# Patient Record
Sex: MALE | Race: WHITE | NOT HISPANIC OR LATINO | Employment: FULL TIME | URBAN - METROPOLITAN AREA
[De-identification: names, ages, dates, MRNs, and addresses within clinical notes are randomized per-mention and may not be internally consistent; named-entity substitution may affect disease eponyms.]

---

## 2018-12-11 ENCOUNTER — OFFICE VISIT (OUTPATIENT)
Dept: URGENT CARE | Facility: CLINIC | Age: 33
End: 2018-12-11
Payer: COMMERCIAL

## 2018-12-11 VITALS
BODY MASS INDEX: 28 KG/M2 | TEMPERATURE: 97.4 F | RESPIRATION RATE: 16 BRPM | OXYGEN SATURATION: 95 % | DIASTOLIC BLOOD PRESSURE: 74 MMHG | SYSTOLIC BLOOD PRESSURE: 134 MMHG | HEIGHT: 71 IN | WEIGHT: 200 LBS | HEART RATE: 86 BPM

## 2018-12-11 DIAGNOSIS — J01.90 ACUTE SINUSITIS, RECURRENCE NOT SPECIFIED, UNSPECIFIED LOCATION: Primary | ICD-10-CM

## 2018-12-11 PROCEDURE — G0382 LEV 3 HOSP TYPE B ED VISIT: HCPCS | Performed by: PHYSICIAN ASSISTANT

## 2018-12-11 RX ORDER — AMOXICILLIN AND CLAVULANATE POTASSIUM 875; 125 MG/1; MG/1
1 TABLET, FILM COATED ORAL EVERY 12 HOURS SCHEDULED
Qty: 14 TABLET | Refills: 0 | Status: SHIPPED | OUTPATIENT
Start: 2018-12-11 | End: 2018-12-18

## 2019-05-25 NOTE — PROGRESS NOTES
[address] [facility]        NAME: Luciano Tate is a 26 y o  male  : 1985    MRN: [id number]  DATE: 2018  TIME: 3:02 PM    Assessment and Plan   Acute sinusitis, recurrence not specified, unspecified location [J01 90]  1  Acute sinusitis, recurrence not specified, unspecified location  amoxicillin-clavulanate (AUGMENTIN) 875-125 mg per tablet     Patient Instructions     Take antibiotic as prescribed  Follow up with PCP in 3-5 days  Proceed to  ER if symptoms worsen  Chief Complaint     Chief Complaint   Patient presents with    Nasal Congestion     Sore throat and congestion x 5 days  Using dayquil and nyquil  History of Present Illness     URI    This is a new problem  Episode onset: 10 days  The problem has been gradually worsening  The maximum temperature recorded prior to his arrival was 100 4 - 100 9 F  Associated symptoms include congestion, coughing, rhinorrhea, sinus pain, a sore throat and swollen glands  Pertinent negatives include no abdominal pain, chest pain, diarrhea, dysuria, ear pain, headaches, joint pain, joint swelling, nausea, neck pain, plugged ear sensation, rash, sneezing, vomiting or wheezing  He has tried nothing for the symptoms  The treatment provided moderate relief  Review of Systems   Review of Systems   Constitutional: Positive for fatigue and fever  Negative for activity change, appetite change, chills, diaphoresis and unexpected weight change  HENT: Positive for congestion, postnasal drip, rhinorrhea, sinus pain and sore throat  Negative for dental problem, drooling, ear discharge, ear pain and sneezing  Respiratory: Positive for cough  Negative for apnea, choking, chest tightness, shortness of breath, wheezing and stridor  Cardiovascular: Negative for chest pain, palpitations and leg swelling  Gastrointestinal: Negative for abdominal pain, diarrhea, nausea and vomiting  Genitourinary: Negative for dysuria     Musculoskeletal: Negative for joint pain and neck pain  Skin: Negative for rash  Neurological: Negative for headaches  Current Medications       Current Outpatient Prescriptions:     amoxicillin-clavulanate (AUGMENTIN) 875-125 mg per tablet, Take 1 tablet by mouth every 12 (twelve) hours for 7 days, Disp: 14 tablet, Rfl: 0    Current Allergies     Allergies as of 12/11/2018    (No Known Allergies)            The following portions of the patient's history were reviewed and updated as appropriate: allergies, current medications, past family history, past medical history, past social history, past surgical history and problem list      History reviewed  No pertinent past medical history  History reviewed  No pertinent surgical history  No family history on file  Medications have been verified  Objective   /74   Pulse 86   Temp (!) 97 4 °F (36 3 °C)   Resp 16   Ht 5' 11" (1 803 m)   Wt 90 7 kg (200 lb)   SpO2 95%   BMI 27 89 kg/m²        Physical Exam     Physical Exam   Constitutional: He appears well-developed and well-nourished  HENT:   Head: Normocephalic  Right Ear: External ear normal    Left Ear: External ear normal    Nose: Mucosal edema and rhinorrhea present  Mouth/Throat: Posterior oropharyngeal erythema present  No oropharyngeal exudate or posterior oropharyngeal edema  Cardiovascular: Normal rate, regular rhythm, normal heart sounds and intact distal pulses  Exam reveals no gallop and no friction rub  No murmur heard  Pulmonary/Chest: Effort normal and breath sounds normal  No respiratory distress  He has no decreased breath sounds  He has no wheezes  He has no rhonchi  He has no rales  Abdominal: Soft  Bowel sounds are normal  He exhibits no distension  There is no tenderness  There is no rebound and no guarding  Lymphadenopathy:     He has cervical adenopathy  Right cervical: Superficial cervical adenopathy present          Left cervical: Superficial cervical adenopathy present  Alert and interactive, no focal deficits

## 2021-05-04 ENCOUNTER — CONSULT (OUTPATIENT)
Dept: SURGERY | Facility: CLINIC | Age: 36
End: 2021-05-04
Payer: COMMERCIAL

## 2021-05-04 VITALS — HEIGHT: 70 IN | BODY MASS INDEX: 30.64 KG/M2 | WEIGHT: 214 LBS

## 2021-05-04 DIAGNOSIS — K42.9 UMBILICAL HERNIA: Primary | ICD-10-CM

## 2021-05-04 PROCEDURE — 99203 OFFICE O/P NEW LOW 30 MIN: CPT | Performed by: SURGERY

## 2021-05-04 RX ORDER — ALLOPURINOL 300 MG/1
300 TABLET ORAL
COMMUNITY
Start: 2021-04-07

## 2021-05-04 RX ORDER — COLCHICINE 0.6 MG/1
0.6 TABLET ORAL AS NEEDED
COMMUNITY
Start: 2021-03-16

## 2021-05-04 NOTE — H&P (VIEW-ONLY)
Assessment/Plan:  Patient presents with umbilical hernia  He has had pain over the last 1 month  The bulge been present over the last 7 months  He has discomfort associated with activity  He desires repair  Operative repair is recommended  Risks and benefits explained in detail  He is agreeable  Patient also complains abdominal bloating  This was associated with eating  This is nonspecific, I do not believe is related to the umbilical hernia site  He denies diarrhea, nausea or weight loss  Diagnoses and all orders for this visit:    Umbilical hernia    Other orders  -     colchicine (COLCRYS) 0 6 mg tablet; Take 0 6 mg by mouth every 8 (eight) hours  -     allopurinol (ZYLOPRIM) 300 mg tablet; Take 300 mg by mouth daily        Subjective:      Patient ID: Francisco Durand is a 38 y o  male  Patient presents for umbilical hernia consult  States he has had a bulge for 7 months  He had achy pain one month ago  Limits his activities  The following portions of the patient's history were reviewed and updated as appropriate:     He  has no past medical history on file  He  has no past surgical history on file  His family history is not on file  He  reports that he has never smoked  He has never used smokeless tobacco  He reports that he does not drink alcohol  No history on file for drug  Current Outpatient Medications   Medication Sig Dispense Refill    allopurinol (ZYLOPRIM) 300 mg tablet Take 300 mg by mouth daily      colchicine (COLCRYS) 0 6 mg tablet Take 0 6 mg by mouth every 8 (eight) hours       No current facility-administered medications for this visit  He has No Known Allergies       Review of Systems   Constitutional: Negative  Negative for activity change  HENT: Negative  Eyes: Negative  Respiratory: Negative  Cardiovascular: Negative  Gastrointestinal: Positive for abdominal pain  Endocrine: Negative  Genitourinary: Negative      Musculoskeletal: Positive for neck pain and neck stiffness  Skin: Negative  Allergic/Immunologic: Negative  Neurological: Negative  Psychiatric/Behavioral: Negative for agitation, behavioral problems and confusion  The patient is not nervous/anxious  All other systems reviewed and are negative  Objective:      Ht 5' 10" (1 778 m)   Wt 97 1 kg (214 lb)   BMI 30 71 kg/m²          Physical Exam  Constitutional:       Appearance: He is well-developed  He is not diaphoretic  HENT:      Head: Normocephalic and atraumatic  Eyes:      General: No scleral icterus  Right eye: No discharge  Left eye: No discharge  Conjunctiva/sclera: Conjunctivae normal       Pupils: Pupils are equal, round, and reactive to light  Neck:      Musculoskeletal: Normal range of motion and neck supple  Thyroid: No thyromegaly  Trachea: No tracheal deviation  Cardiovascular:      Rate and Rhythm: Normal rate and regular rhythm  Heart sounds: Normal heart sounds  No murmur  No friction rub  Pulmonary:      Effort: Pulmonary effort is normal  No respiratory distress  Breath sounds: Normal breath sounds  No stridor  No wheezing  Chest:      Chest wall: No tenderness  Abdominal:      General: There is no distension  Palpations: Abdomen is soft  There is no mass  Tenderness: There is no abdominal tenderness  There is no guarding or rebound  Hernia: A hernia (Umbilical hernia is present  This is tender to palpation ) is present  Musculoskeletal: Normal range of motion  General: No tenderness  Lymphadenopathy:      Cervical: No cervical adenopathy  Skin:     General: Skin is warm and dry  Findings: No erythema or rash  Neurological:      Mental Status: He is alert and oriented to person, place, and time  Cranial Nerves: No cranial nerve deficit        Coordination: Coordination normal    Psychiatric:         Behavior: Behavior normal          Judgment: Judgment normal

## 2021-05-04 NOTE — PROGRESS NOTES
Assessment/Plan:  Patient presents with umbilical hernia  He has had pain over the last 1 month  The bulge been present over the last 7 months  He has discomfort associated with activity  He desires repair  Operative repair is recommended  Risks and benefits explained in detail  He is agreeable  Patient also complains abdominal bloating  This was associated with eating  This is nonspecific, I do not believe is related to the umbilical hernia site  He denies diarrhea, nausea or weight loss  Diagnoses and all orders for this visit:    Umbilical hernia    Other orders  -     colchicine (COLCRYS) 0 6 mg tablet; Take 0 6 mg by mouth every 8 (eight) hours  -     allopurinol (ZYLOPRIM) 300 mg tablet; Take 300 mg by mouth daily        Subjective:      Patient ID: Millicent Macias is a 38 y o  male  Patient presents for umbilical hernia consult  States he has had a bulge for 7 months  He had achy pain one month ago  Limits his activities  The following portions of the patient's history were reviewed and updated as appropriate:     He  has no past medical history on file  He  has no past surgical history on file  His family history is not on file  He  reports that he has never smoked  He has never used smokeless tobacco  He reports that he does not drink alcohol  No history on file for drug  Current Outpatient Medications   Medication Sig Dispense Refill    allopurinol (ZYLOPRIM) 300 mg tablet Take 300 mg by mouth daily      colchicine (COLCRYS) 0 6 mg tablet Take 0 6 mg by mouth every 8 (eight) hours       No current facility-administered medications for this visit  He has No Known Allergies       Review of Systems   Constitutional: Negative  Negative for activity change  HENT: Negative  Eyes: Negative  Respiratory: Negative  Cardiovascular: Negative  Gastrointestinal: Positive for abdominal pain  Endocrine: Negative  Genitourinary: Negative      Musculoskeletal: Positive for neck pain and neck stiffness  Skin: Negative  Allergic/Immunologic: Negative  Neurological: Negative  Psychiatric/Behavioral: Negative for agitation, behavioral problems and confusion  The patient is not nervous/anxious  All other systems reviewed and are negative  Objective:      Ht 5' 10" (1 778 m)   Wt 97 1 kg (214 lb)   BMI 30 71 kg/m²          Physical Exam  Constitutional:       Appearance: He is well-developed  He is not diaphoretic  HENT:      Head: Normocephalic and atraumatic  Eyes:      General: No scleral icterus  Right eye: No discharge  Left eye: No discharge  Conjunctiva/sclera: Conjunctivae normal       Pupils: Pupils are equal, round, and reactive to light  Neck:      Musculoskeletal: Normal range of motion and neck supple  Thyroid: No thyromegaly  Trachea: No tracheal deviation  Cardiovascular:      Rate and Rhythm: Normal rate and regular rhythm  Heart sounds: Normal heart sounds  No murmur  No friction rub  Pulmonary:      Effort: Pulmonary effort is normal  No respiratory distress  Breath sounds: Normal breath sounds  No stridor  No wheezing  Chest:      Chest wall: No tenderness  Abdominal:      General: There is no distension  Palpations: Abdomen is soft  There is no mass  Tenderness: There is no abdominal tenderness  There is no guarding or rebound  Hernia: A hernia (Umbilical hernia is present  This is tender to palpation ) is present  Musculoskeletal: Normal range of motion  General: No tenderness  Lymphadenopathy:      Cervical: No cervical adenopathy  Skin:     General: Skin is warm and dry  Findings: No erythema or rash  Neurological:      Mental Status: He is alert and oriented to person, place, and time  Cranial Nerves: No cranial nerve deficit        Coordination: Coordination normal    Psychiatric:         Behavior: Behavior normal          Judgment: Judgment normal

## 2021-05-05 ENCOUNTER — PREP FOR PROCEDURE (OUTPATIENT)
Dept: SURGERY | Facility: CLINIC | Age: 36
End: 2021-05-05

## 2021-05-05 DIAGNOSIS — K42.9 UMBILICAL HERNIA: Primary | ICD-10-CM

## 2021-05-16 NOTE — DISCHARGE INSTRUCTIONS
Please call the office when you leave to schedule an appointment for 2 weeks  This can be a virtual / telephone consultation or it can be in person  The choice is yours  Please call [phone]   [facility] [address], [facility], [address]  [address] between [facility] and [facility]  Activity:    May lift 10 lb as many times as desired the 1st week,       20 lb in 2 weeks,       30 lb in 3 weeks  Walking is encouraged  Normal daily activities including climbing steps are okay  Do not engage in strenuous activity ( sit-ups or crutches) or contact sports for 4-6 weeks post-operatively    Return to Work:   Okay to return to work when you feel well if you desire  Diet:   You may return to your normal healthy diet  Wound Care: Your wound is closed with dissolvable stitches and glue  It is okay to shower  Wash incision gently with soap and water and pat dry  Do not soak incisions in bath water or swim for two weeks  Do not apply any creams or ointments  Pain Medication:   Please take as directed if needed  May use Advil or Motrin in addition  Recall, the pain medicine and anesthesia is associated with constipation  No driving while taking narcotic pain medications  Other: It is normal to developed a healing ridge / firm incision after surgery  This is your body making scar tissue  It is a good sign  Constipation is very common after general anesthesia  Please use milk of magnesia as needed in order to help prevent constipation  It is normal to get bruising after surgery  If you have questions after discharge please call the office      If you have increased pain, fever >101 5, increased drainage, redness or a bad smell at your surgery site, please call us immediately or come directly to the Emergency Room

## 2021-05-18 NOTE — PRE-PROCEDURE INSTRUCTIONS
Pre-Surgery Instructions:   Medication Instructions    allopurinol (ZYLOPRIM) 300 mg tablet Instructed to take per normal schedule @ Bedtime    colchicine (COLCRYS) 0 6 mg tablet Instructed to take as needed including DOS with sips water    Pre op instructions per My Surgical Experience booklet,medications per anesthesia guidelines and showering instructions per Nikolay protocol reviewed-Patient has CHG  Pt  Verbalized understanding of current visitor restrictions  Instructed to avoid all ASA/NSAIDs and OTC Vit/Supp from now until after surgery  Tylenol ok prn  Pt  Verbalized an understanding of all instructions reviewed and offers no concerns at this time

## 2021-05-19 ENCOUNTER — ANESTHESIA (OUTPATIENT)
Dept: PERIOP | Facility: HOSPITAL | Age: 36
End: 2021-05-19
Payer: COMMERCIAL

## 2021-05-19 ENCOUNTER — ANESTHESIA EVENT (OUTPATIENT)
Dept: PERIOP | Facility: HOSPITAL | Age: 36
End: 2021-05-19
Payer: COMMERCIAL

## 2021-05-19 ENCOUNTER — HOSPITAL ENCOUNTER (OUTPATIENT)
Facility: HOSPITAL | Age: 36
Setting detail: OUTPATIENT SURGERY
Discharge: HOME | End: 2021-05-19
Attending: SURGERY | Admitting: SURGERY
Payer: COMMERCIAL

## 2021-05-19 VITALS
DIASTOLIC BLOOD PRESSURE: 72 MMHG | HEART RATE: 73 BPM | HEIGHT: 70 IN | OXYGEN SATURATION: 73 % | RESPIRATION RATE: 18 BRPM | SYSTOLIC BLOOD PRESSURE: 117 MMHG | WEIGHT: 214 LBS | TEMPERATURE: 97.1 F | BODY MASS INDEX: 30.64 KG/M2

## 2021-05-19 DIAGNOSIS — K42.9 UMBILICAL HERNIA: Primary | ICD-10-CM

## 2021-05-19 PROCEDURE — 49585 PR REPAIR UMBILICAL HERN,5+Y/O,REDUC: CPT | Performed by: SURGERY

## 2021-05-19 RX ORDER — HYDROMORPHONE HCL/PF 1 MG/ML
0.5 SYRINGE (ML) INJECTION
Status: DISCONTINUED | OUTPATIENT
Start: 2021-05-19 | End: 2021-05-19 | Stop reason: HOSPADM

## 2021-05-19 RX ORDER — ONDANSETRON 2 MG/ML
4 INJECTION INTRAMUSCULAR; INTRAVENOUS EVERY 4 HOURS PRN
Status: DISCONTINUED | OUTPATIENT
Start: 2021-05-19 | End: 2021-05-19 | Stop reason: HOSPADM

## 2021-05-19 RX ORDER — MIDAZOLAM HYDROCHLORIDE 2 MG/2ML
INJECTION, SOLUTION INTRAMUSCULAR; INTRAVENOUS AS NEEDED
Status: DISCONTINUED | OUTPATIENT
Start: 2021-05-19 | End: 2021-05-19

## 2021-05-19 RX ORDER — ACETAMINOPHEN 325 MG/1
650 TABLET ORAL EVERY 4 HOURS PRN
Status: DISCONTINUED | OUTPATIENT
Start: 2021-05-19 | End: 2021-05-19 | Stop reason: HOSPADM

## 2021-05-19 RX ORDER — PROPOFOL 10 MG/ML
INJECTION, EMULSION INTRAVENOUS AS NEEDED
Status: DISCONTINUED | OUTPATIENT
Start: 2021-05-19 | End: 2021-05-19

## 2021-05-19 RX ORDER — METOCLOPRAMIDE HYDROCHLORIDE 5 MG/ML
10 INJECTION INTRAMUSCULAR; INTRAVENOUS ONCE AS NEEDED
Status: CANCELLED | OUTPATIENT
Start: 2021-05-19

## 2021-05-19 RX ORDER — KETOROLAC TROMETHAMINE 30 MG/ML
INJECTION, SOLUTION INTRAMUSCULAR; INTRAVENOUS AS NEEDED
Status: DISCONTINUED | OUTPATIENT
Start: 2021-05-19 | End: 2021-05-19

## 2021-05-19 RX ORDER — MAGNESIUM HYDROXIDE 1200 MG/15ML
LIQUID ORAL AS NEEDED
Status: DISCONTINUED | OUTPATIENT
Start: 2021-05-19 | End: 2021-05-19 | Stop reason: HOSPADM

## 2021-05-19 RX ORDER — CEFAZOLIN SODIUM 2 G/50ML
SOLUTION INTRAVENOUS AS NEEDED
Status: DISCONTINUED | OUTPATIENT
Start: 2021-05-19 | End: 2021-05-19

## 2021-05-19 RX ORDER — LIDOCAINE HYDROCHLORIDE 10 MG/ML
INJECTION, SOLUTION EPIDURAL; INFILTRATION; INTRACAUDAL; PERINEURAL AS NEEDED
Status: DISCONTINUED | OUTPATIENT
Start: 2021-05-19 | End: 2021-05-19

## 2021-05-19 RX ORDER — CEFAZOLIN SODIUM 2 G/50ML
2000 SOLUTION INTRAVENOUS ONCE
Status: DISCONTINUED | OUTPATIENT
Start: 2021-05-19 | End: 2021-05-19 | Stop reason: HOSPADM

## 2021-05-19 RX ORDER — SODIUM CHLORIDE, SODIUM LACTATE, POTASSIUM CHLORIDE, CALCIUM CHLORIDE 600; 310; 30; 20 MG/100ML; MG/100ML; MG/100ML; MG/100ML
125 INJECTION, SOLUTION INTRAVENOUS CONTINUOUS
Status: DISCONTINUED | OUTPATIENT
Start: 2021-05-19 | End: 2021-05-19 | Stop reason: HOSPADM

## 2021-05-19 RX ORDER — ONDANSETRON 2 MG/ML
INJECTION INTRAMUSCULAR; INTRAVENOUS AS NEEDED
Status: DISCONTINUED | OUTPATIENT
Start: 2021-05-19 | End: 2021-05-19

## 2021-05-19 RX ORDER — FENTANYL CITRATE/PF 50 MCG/ML
50 SYRINGE (ML) INJECTION
Status: CANCELLED | OUTPATIENT
Start: 2021-05-19

## 2021-05-19 RX ORDER — OXYCODONE HYDROCHLORIDE AND ACETAMINOPHEN 5; 325 MG/1; MG/1
1 TABLET ORAL EVERY 4 HOURS PRN
Qty: 10 TABLET | Refills: 0 | Status: SHIPPED | OUTPATIENT
Start: 2021-05-19

## 2021-05-19 RX ORDER — HYDROMORPHONE HCL/PF 1 MG/ML
0.5 SYRINGE (ML) INJECTION
Status: CANCELLED | OUTPATIENT
Start: 2021-05-19

## 2021-05-19 RX ORDER — OXYCODONE HYDROCHLORIDE AND ACETAMINOPHEN 5; 325 MG/1; MG/1
1 TABLET ORAL EVERY 4 HOURS PRN
Status: DISCONTINUED | OUTPATIENT
Start: 2021-05-19 | End: 2021-05-19 | Stop reason: HOSPADM

## 2021-05-19 RX ORDER — BUPIVACAINE HYDROCHLORIDE 2.5 MG/ML
INJECTION, SOLUTION EPIDURAL; INFILTRATION; INTRACAUDAL AS NEEDED
Status: DISCONTINUED | OUTPATIENT
Start: 2021-05-19 | End: 2021-05-19 | Stop reason: HOSPADM

## 2021-05-19 RX ORDER — DEXAMETHASONE SODIUM PHOSPHATE 4 MG/ML
INJECTION, SOLUTION INTRA-ARTICULAR; INTRALESIONAL; INTRAMUSCULAR; INTRAVENOUS; SOFT TISSUE AS NEEDED
Status: DISCONTINUED | OUTPATIENT
Start: 2021-05-19 | End: 2021-05-19

## 2021-05-19 RX ORDER — FENTANYL CITRATE 50 UG/ML
INJECTION, SOLUTION INTRAMUSCULAR; INTRAVENOUS AS NEEDED
Status: DISCONTINUED | OUTPATIENT
Start: 2021-05-19 | End: 2021-05-19

## 2021-05-19 RX ADMIN — FENTANYL CITRATE 25 MCG: 50 INJECTION, SOLUTION INTRAMUSCULAR; INTRAVENOUS at 11:32

## 2021-05-19 RX ADMIN — DEXAMETHASONE SODIUM PHOSPHATE 4 MG: 4 INJECTION INTRA-ARTICULAR; INTRALESIONAL; INTRAMUSCULAR; INTRAVENOUS; SOFT TISSUE at 11:30

## 2021-05-19 RX ADMIN — OXYCODONE HYDROCHLORIDE AND ACETAMINOPHEN 1 TABLET: 5; 325 TABLET ORAL at 13:40

## 2021-05-19 RX ADMIN — KETOROLAC TROMETHAMINE 15 MG: 30 INJECTION, SOLUTION INTRAMUSCULAR at 12:00

## 2021-05-19 RX ADMIN — FENTANYL CITRATE 25 MCG: 50 INJECTION, SOLUTION INTRAMUSCULAR; INTRAVENOUS at 11:27

## 2021-05-19 RX ADMIN — CEFAZOLIN SODIUM 2000 MG: 2 SOLUTION INTRAVENOUS at 11:15

## 2021-05-19 RX ADMIN — LIDOCAINE HYDROCHLORIDE 50 MG: 10 INJECTION, SOLUTION EPIDURAL; INFILTRATION; INTRACAUDAL at 11:27

## 2021-05-19 RX ADMIN — MIDAZOLAM HYDROCHLORIDE 2 MG: 1 INJECTION, SOLUTION INTRAMUSCULAR; INTRAVENOUS at 11:25

## 2021-05-19 RX ADMIN — FENTANYL CITRATE 25 MCG: 50 INJECTION, SOLUTION INTRAMUSCULAR; INTRAVENOUS at 12:12

## 2021-05-19 RX ADMIN — SODIUM CHLORIDE, SODIUM LACTATE, POTASSIUM CHLORIDE, AND CALCIUM CHLORIDE: .6; .31; .03; .02 INJECTION, SOLUTION INTRAVENOUS at 11:25

## 2021-05-19 RX ADMIN — FENTANYL CITRATE 25 MCG: 50 INJECTION, SOLUTION INTRAMUSCULAR; INTRAVENOUS at 11:48

## 2021-05-19 RX ADMIN — ONDANSETRON 4 MG: 2 INJECTION INTRAMUSCULAR; INTRAVENOUS at 11:30

## 2021-05-19 RX ADMIN — PROPOFOL 250 MG: 10 INJECTION, EMULSION INTRAVENOUS at 11:27

## 2021-05-19 NOTE — ANESTHESIA PREPROCEDURE EVALUATION
Procedure:  UMBILICAL HERNIA REPAIR (N/A Abdomen)    Relevant Problems   No relevant active problems        Physical Exam    Airway    Mallampati score: II  TM Distance: >3 FB  Neck ROM: full     Dental   No notable dental hx     Cardiovascular      Pulmonary      Other Findings        Anesthesia Plan  ASA Score- 2     Anesthesia Type- general with ASA Monitors  Additional Monitors:   Airway Plan: LMA  Plan Factors-Exercise tolerance (METS): >4 METS  Chart reviewed  Patient summary reviewed  Patient is not a current smoker  There is medical exclusion for perioperative obstructive sleep apnea risk education  Induction- intravenous  Postoperative Plan- Plan for postoperative opioid use  Informed Consent- Anesthetic plan and risks discussed with patient  I personally reviewed this patient with the CRNA  Discussed and agreed on the Anesthesia Plan with the CRNA  Bri Gutierrez

## 2021-05-19 NOTE — INTERVAL H&P NOTE
H&P reviewed  After examining the patient I find no changes in the patients condition since the H&P had been written      Vitals:    05/19/21 1009   BP: 129/75   Pulse: 72   Resp: 20   Temp: (!) 97 2 °F (36 2 °C)   SpO2: 97%

## 2021-05-19 NOTE — ANESTHESIA POSTPROCEDURE EVALUATION
Post-Op Assessment Note    CV Status:  Stable  Pain Score: 0    Pain management: adequate     Mental Status:  Alert and awake   Hydration Status:  Euvolemic and stable   PONV Controlled:  None   Airway Patency:  Patent      Post Op Vitals Reviewed: Yes      Staff: Anesthesiologist, with CRNAs         No complications documented      BP      Temp     Pulse     Resp      SpO2

## 2021-05-19 NOTE — OP NOTE
OPERATIVE REPORT  PATIENT NAME: Basilio Rondon    :  1985  MRN: [id number]  Pt Location: [facility]    SURGERY DATE: 2021    Surgeon(s) and Role:     * Estela Brewer MD - Primary     * Markell An MD - Assisting    Preop Diagnosis:  Umbilical hernia [Z63 3]    Post-Op Diagnosis Codes:     * Umbilical hernia [T37 4]    Procedure(s) (LRB):  UMBILICAL HERNIA REPAIR (N/A) without mesh    Specimen(s):  * No specimens in log *    Estimated Blood Loss:   10 mL    Drains:  * No LDAs found *    Anesthesia Type:   General    Operative Indications:  Umbilical hernia [V66 5]      Operative Findings:  Independent, nonsmoker, ASA 1, wound class 1, BMI 30, weight 210, height 70    Complications:   None    Procedure and Technique:  Patient was identified visually and via armband  Placed in supine position  After general anesthesia the abdomen was prepped and draped in a sterile fashion  0 25% Marcaine with epinephrine was utilized throughout the procedure  Incision was made and carried down through skin subcutaneous tissue  A direct vision the umbilical hernia was found  Hernia sac was amputated  The hernia was then repaired primarily with interrupted figure-of-eight 1  Vicryl suture  The wounds are closed with 3 0 Vicryl subcutaneous and 4 Monocryl sutures  Dermabond was applied  The patient tolerated this procedure well  Sponge instrument count correct x2     I was present for the entire procedure    Patient Disposition:  PACU     SIGNATURE: Estela Brewer MD  DATE: May 19, 2021  TIME: 12:22 PM

## 2021-06-01 ENCOUNTER — OFFICE VISIT (OUTPATIENT)
Dept: SURGERY | Facility: CLINIC | Age: 36
End: 2021-06-01

## 2021-06-01 DIAGNOSIS — K42.9 UMBILICAL HERNIA: Primary | ICD-10-CM

## 2021-06-01 PROCEDURE — 99024 POSTOP FOLLOW-UP VISIT: CPT | Performed by: SURGERY

## 2021-06-01 NOTE — PROGRESS NOTES
Assessment/Plan:  Patient is status post umbilical hernia repair  He feels well  He has no complaints  Incision is clean and healing nicely  Activity instructions provided  All questions were answered  Diagnoses and all orders for this visit:    Umbilical hernia        Pathology: None sent      Postoperative restrictions reviewed  All questions answered  ______________________________________________________  HPI: Patient presents post operatively  Umbilical hernia repair 2/33/0066  Patient Active Problem List   Diagnosis    Umbilical hernia       Allergies:  Patient has no known allergies  Current Outpatient Medications:     allopurinol (ZYLOPRIM) 300 mg tablet, Take 300 mg by mouth daily at bedtime , Disp: , Rfl:     colchicine (COLCRYS) 0 6 mg tablet, Take 0 6 mg by mouth as needed , Disp: , Rfl:     oxyCODONE-acetaminophen (PERCOCET) 5-325 mg per tablet, Take 1 tablet by mouth every 4 (four) hours as needed for moderate pain for up to 10 dosesMax Daily Amount: 6 tablets, Disp: 10 tablet, Rfl: 0    Past Medical History:   Diagnosis Date    Gout        Past Surgical History:   Procedure Laterality Date    NO PAST SURGERIES      [place] REPAIR UMBILICAL NVTY,3+O/E,XMGBG N/A 5/19/2021    Procedure: UMBILICAL HERNIA REPAIR;  Surgeon: Casandra Bucio MD;  Location: [facility];  Service: General       History reviewed  No pertinent family history  reports that he has never smoked  He has never used smokeless tobacco  He reports that he does not drink alcohol or use drugs  PHYSICAL EXAM    There were no vitals taken for this visit      General: normal, cooperative, no distress  Abdominal: soft, nondistended or nontender  Incision: clean, dry, and intact and healing well      Casandra Bucio MD    Date: 6/1/2021 Time: 1:27 PM

## 2022-10-06 ENCOUNTER — CONSULT (OUTPATIENT)
Dept: UROLOGY | Facility: CLINIC | Age: 37
End: 2022-10-06
Payer: COMMERCIAL

## 2022-10-06 VITALS
DIASTOLIC BLOOD PRESSURE: 84 MMHG | WEIGHT: 208.2 LBS | BODY MASS INDEX: 29.81 KG/M2 | SYSTOLIC BLOOD PRESSURE: 132 MMHG | HEART RATE: 74 BPM | OXYGEN SATURATION: 94 % | HEIGHT: 70 IN

## 2022-10-06 DIAGNOSIS — Z30.2 ENCOUNTER FOR STERILIZATION: Primary | ICD-10-CM

## 2022-10-06 PROCEDURE — 99203 OFFICE O/P NEW LOW 30 MIN: CPT | Performed by: PHYSICIAN ASSISTANT

## 2022-10-06 RX ORDER — DIAZEPAM 5 MG/1
TABLET ORAL
Qty: 2 TABLET | Refills: 0 | Status: SHIPPED | OUTPATIENT
Start: 2022-10-06 | End: 2022-10-07

## 2022-10-06 NOTE — PROGRESS NOTES
Referring Physician: Jennifer Phillips MD  A copy of this consultation note was communicated to the referring physician  Diagnoses and all orders for this visit:    Encounter for sterilization  -     diazepam (VALIUM) 5 mg tablet; Take both tablets 1 hour prior to the procedure            Assessment and plan:       We had a long discussion regarding the options for birth control  I told the patient that vasectomy is considered to be a permanent surgical sterilization procedure  We spoke about other options including the possibility of vasectomy reversal at a later time  He understands that vasectomy confers no immunity to STDs  I also told him that according to our present knowledge, there is no causal relationship between vasectomy and subsequent development of prostate cancer or testicular cancer  No change in libido erection or ejaculation  We spoke about the potential complications  The most common one in the short term is scrotal hematoma and infection, which rarely requires re-operation  Additionally, he can react to the anesthetic, develop scrotal swelling, have pain or skin bruising  We spoke about post procedure care to try to minimize this complication  I also asked him to refrain from aspirin or fish oil products and alcohol prior to the procedure  The long-term complications include but are not limited to vasectomy failure by recanalization, chronic epididymal discomfort, pain, among other possibilities  I described to him how this procedure is normally performed in an office setting and he understands that if anesthesia is desired, this can be performed for him in an outpatient operative setting  Finally, he understands that following vasectomy, hell need to use other means of birth control until hes had semen analyses that demonstrate the absence of sperm  He understands it will be his responsibility to submit these semen specimens and call our office for the results   I told him again that recanalization is a small but real possibility, and if he is ever concerned about it he can submit another semen specimen for analysis  After discussing the risks, benefits, possible complications and alternatives, informed consents were obtained  Diazepam was prescribed, and review dosing, adverse effects and timing of the procedure  He will return in the near future for the procedure  Chief Complaint     Desire for vasectomy      History of Present Illness     Margarita Hutchison is a 43 y o  male referred for evaluation of vasectomy  He has 0 biological children  He states that he and his partner have come to the mutual decision they do not desire any additional children  He has no prior past medical, past surgical, or past urologic history    Detailed Urologic History     - please refer to HPI    Review of Systems     Review of Systems   Constitutional: Negative for activity change and fatigue  HENT: Negative for congestion  Eyes: Negative for visual disturbance  Respiratory: Negative for shortness of breath and wheezing  Cardiovascular: Negative for chest pain and leg swelling  Gastrointestinal: Negative for abdominal pain  Endocrine: Negative for polyuria  Genitourinary: Negative for dysuria, flank pain, hematuria and urgency  Musculoskeletal: Negative for back pain  Allergic/Immunologic: Negative for immunocompromised state  Neurological: Negative for dizziness and numbness  Psychiatric/Behavioral: Negative for dysphoric mood  All other systems reviewed and are negative  Allergies     No Known Allergies    Physical Exam     Physical Exam   Constitutional: He is oriented to person, place, and time  He appears well-developed and well-nourished  No distress  HENT:   Head: Normocephalic and atraumatic  Eyes: EOM are normal    Neck: Normal range of motion     Cardiovascular:   Negative lower extremity edema   Pulmonary/Chest: Effort normal and breath sounds normal    Abdominal: Soft  Genitourinary:   Genitourinary Comments: Vas deferens are palpable bilaterally  Musculoskeletal: Normal range of motion  Neurological: He is alert and oriented to person, place, and time  Skin: Skin is warm  Psychiatric: He has a normal mood and affect  His behavior is normal          Vital Signs  Vitals:    10/06/22 0933   BP: 132/84   BP Location: Left arm   Patient Position: Sitting   Cuff Size: Adult   Pulse: 74   SpO2: 94%   Weight: 94 4 kg (208 lb 3 2 oz)   Height: 5' 10" (1 778 m)         Current Medications       Current Outpatient Medications:     allopurinol (ZYLOPRIM) 300 mg tablet, Take 300 mg by mouth daily at bedtime , Disp: , Rfl:     colchicine (COLCRYS) 0 6 mg tablet, Take 0 6 mg by mouth as needed , Disp: , Rfl:     oxyCODONE-acetaminophen (PERCOCET) 5-325 mg per tablet, Take 1 tablet by mouth every 4 (four) hours as needed for moderate pain for up to 10 dosesMax Daily Amount: 6 tablets (Patient not taking: No sig reported), Disp: 10 tablet, Rfl: 0      Active Problems     Patient Active Problem List   Diagnosis    Umbilical hernia         Past Medical History     Past Medical History:   Diagnosis Date    Gout          Surgical History     Past Surgical History:   Procedure Laterality Date    NO PAST SURGERIES      MO REPAIR UMBILICAL KTVY,9+T/Q,OWUGD N/A 5/19/2021    Procedure: UMBILICAL HERNIA REPAIR;  Surgeon: Linda Miranda MD;  Location: [facility];  Service: General         Family History     History reviewed  No pertinent family history  Social History     Social History     Social History     Tobacco Use   Smoking Status Never Smoker   Smokeless Tobacco Current User    Types: Chew       Portions of the record may have been created with voice recognition software  Occasional wrong word or "sound a like" substitutions may have occurred due to the inherent limitations of voice recognition software    Read the chart carefully and recognize, using context, where substitutions have occurred

## 2022-10-07 DIAGNOSIS — Z30.2 ENCOUNTER FOR STERILIZATION: ICD-10-CM

## 2022-10-07 RX ORDER — DIAZEPAM 5 MG/1
TABLET ORAL
Qty: 2 TABLET | Refills: 0 | Status: SHIPPED | OUTPATIENT
Start: 2022-10-07

## 2023-01-05 ENCOUNTER — PROCEDURE VISIT (OUTPATIENT)
Dept: UROLOGY | Facility: CLINIC | Age: 38
End: 2023-01-05

## 2023-01-05 VITALS
HEIGHT: 70 IN | BODY MASS INDEX: 30.58 KG/M2 | RESPIRATION RATE: 16 BRPM | SYSTOLIC BLOOD PRESSURE: 128 MMHG | OXYGEN SATURATION: 98 % | DIASTOLIC BLOOD PRESSURE: 78 MMHG | HEART RATE: 85 BPM | WEIGHT: 213.6 LBS

## 2023-01-05 DIAGNOSIS — Z30.2 ENCOUNTER FOR STERILIZATION: Primary | ICD-10-CM

## 2023-01-05 NOTE — PROGRESS NOTES
Procedures      No Scalpel Vasectomy Procedure Note    Indications: 42 y o   y o  male desiring permanent sterilization    Pre-operative Diagnosis: Undesired fertility    Post-operative Diagnosis: Undesired fertility    Anesthesia: Lidocaine 2% without epinephrine      Procedure Details     The risks and benefits of the procedure were discussed at the pre-procedure consultation, and written, informed consent obtained  Premedicated with Valium 10 mgm po 60 minutes prior to procedure  The scrotum was palpated with both testes normal in size and position, no masses palpitated  The scrotum was cleansed with warm Betadine and draped in the usual sterile manner  A vasal sheath block was performed on both the left and right vas  After adequate anesthesia was established, a small perforation was made in the skin and the left vas was isolated with the ring forceps, dissected free and delivered through the skin perforation  The left vas was divided, approximately 1 5 cm portion removed, and each end of the vas was cauterized and suture ligated  The ends of the vas were replaced in the scrotum through the puncture site  The right vas was then isolated, divided, cauterized and ligated in a similar fashion  Midportions removed were sent to pathology to confirm  Any bleeding was controlled with electrocautery  The puncture site was dry when the procedure was completed  After discussion with the patient, the puncture site was sutured using single 5-0 chromic suture in figure 8 fashion  Specimen:   1  Right vas deferens  2  Left vas deferens    Condition: Stable    Complications: none    Plan:        He did very well with the procedure today  Postprocedural instructions were provided, including instructions, scheduling information, and the specimen cup for his postprocedural hemodialysis    He was instructed to perform this at 6 weeks and 2 call my office after the specimen is submitted to review the results by phone  He was instructed to continue his current methods of contraception until that time

## 2023-02-23 ENCOUNTER — APPOINTMENT (OUTPATIENT)
Dept: LAB | Facility: CLINIC | Age: 38
End: 2023-02-23

## 2023-02-23 DIAGNOSIS — Z30.2 ENCOUNTER FOR STERILIZATION: ICD-10-CM

## 2023-02-23 LAB
DEPRECATED CD4 CELLS/CD8 CELLS BLD: 3 ML
SPERM MOTILE SMN QL MICRO: NORMAL

## 2023-05-22 ENCOUNTER — HOSPITAL ENCOUNTER (OUTPATIENT)
Dept: MRI IMAGING | Facility: HOSPITAL | Age: 38
Discharge: HOME | End: 2023-05-22

## 2023-05-22 DIAGNOSIS — K76.89 OTHER SPECIFIED DISEASES OF LIVER: ICD-10-CM

## 2023-05-22 RX ADMIN — GADOBUTROL 9 ML: 604.72 INJECTION INTRAVENOUS at 09:07

## 2023-08-01 ENCOUNTER — OFFICE VISIT (OUTPATIENT)
Dept: UROLOGY | Facility: CLINIC | Age: 38
End: 2023-08-01
Payer: COMMERCIAL

## 2023-08-01 VITALS
DIASTOLIC BLOOD PRESSURE: 70 MMHG | OXYGEN SATURATION: 97 % | BODY MASS INDEX: 30.78 KG/M2 | SYSTOLIC BLOOD PRESSURE: 118 MMHG | WEIGHT: 215 LBS | HEIGHT: 70 IN | HEART RATE: 70 BPM

## 2023-08-01 DIAGNOSIS — N50.812 PAIN IN LEFT TESTICLE: Primary | ICD-10-CM

## 2023-08-01 PROCEDURE — 99213 OFFICE O/P EST LOW 20 MIN: CPT | Performed by: PHYSICIAN ASSISTANT

## 2023-08-01 RX ORDER — CEPHALEXIN 500 MG/1
500 CAPSULE ORAL EVERY 6 HOURS SCHEDULED
Qty: 28 CAPSULE | Refills: 0 | Status: SHIPPED | OUTPATIENT
Start: 2023-08-01 | End: 2023-08-08

## 2023-08-01 NOTE — PROGRESS NOTES
UROLOGY PROGRESS NOTE   Patient Identifiers: Devon Farrell (MRN [id number])  Date of Service: 8/1/2023    Subjective:   68-year-old male who had a vasectomy last year. He comes in complaining of left-sided orchialgia. Denies any injury. He has point tenderness in the specific area. It has been pretty stable and not increasing or decreasing. Reason for visit: Orchialgia follow-up    Objective:     VITALS:    Vitals:    08/01/23 0733   BP: 118/70   Pulse: 70   SpO2: 97%           LABS:  No results found for: "HGB", "HCT", "WBC", "PLT"]    No results found for: "NA", "K", "CL", "CO2", "BUN", "CREATININE", "CALCIUM", "GLUCOSE"]        INPATIENT MEDS:    Current Outpatient Medications:   •  allopurinol (ZYLOPRIM) 300 mg tablet, Take 300 mg by mouth daily at bedtime , Disp: , Rfl:   •  colchicine (COLCRYS) 0.6 mg tablet, Take 0.6 mg by mouth as needed , Disp: , Rfl:   •  diazepam (VALIUM) 5 mg tablet, TAKE BOTH TABLETS 1 HOUR PRIOR TO THE PROCEDURE (Patient not taking: Reported on 1/5/2023), Disp: 2 tablet, Rfl: 0  •  oxyCODONE-acetaminophen (PERCOCET) 5-325 mg per tablet, Take 1 tablet by mouth every 4 (four) hours as needed for moderate pain for up to 10 dosesMax Daily Amount: 6 tablets (Patient not taking: Reported on 10/6/2022), Disp: 10 tablet, Rfl: 0      Physical Exam:   /70 (BP Location: Left arm, Patient Position: Sitting, Cuff Size: Large)   Pulse 70   Ht 5' 10" (1.778 m)   Wt 97.5 kg (215 lb)   SpO2 97%   BMI 30.85 kg/m²   GEN: no acute distress    RESP: breathing comfortably with no accessory muscle use    ABD: soft, non-tender, non-distended   INCISION:    EXT: no significant peripheral edema   (Male): Penis circumcised, phallus normal, meatus patent. Testicles descended into scrotum bilaterally. Point tenderness in the left epididymis with a slightly tender nodule consistent with epididymal cyst or sperm granuloma. No inguinal hernias bilaterally.     RADIOLOGY: None    Assessment:   #1.   Orchialgia    Plan:   -Keflex for 7 days  -Scrotal ultrasound  -Follow-up in 6 weeks  -

## 2023-09-14 ENCOUNTER — HOSPITAL ENCOUNTER (OUTPATIENT)
Dept: ULTRASOUND IMAGING | Facility: HOSPITAL | Age: 38
Discharge: HOME | End: 2023-09-14
Payer: COMMERCIAL

## 2023-09-14 DIAGNOSIS — N50.812 PAIN IN LEFT TESTICLE: ICD-10-CM

## 2023-09-14 PROCEDURE — 76870 US EXAM SCROTUM: CPT

## 2023-09-21 ENCOUNTER — OFFICE VISIT (OUTPATIENT)
Dept: UROLOGY | Facility: CLINIC | Age: 38
End: 2023-09-21
Payer: COMMERCIAL

## 2023-09-21 VITALS
DIASTOLIC BLOOD PRESSURE: 88 MMHG | SYSTOLIC BLOOD PRESSURE: 128 MMHG | HEIGHT: 70 IN | BODY MASS INDEX: 30.49 KG/M2 | WEIGHT: 213 LBS

## 2023-09-21 DIAGNOSIS — N50.812 PAIN IN LEFT TESTICLE: Primary | ICD-10-CM

## 2023-09-21 PROCEDURE — 99213 OFFICE O/P EST LOW 20 MIN: CPT | Performed by: PHYSICIAN ASSISTANT

## 2023-09-21 RX ORDER — KETOCONAZOLE 20 MG/G
CREAM TOPICAL
COMMUNITY
Start: 2023-08-29

## 2023-09-21 NOTE — PROGRESS NOTES
UROLOGY PROGRESS NOTE   Patient Identifiers: Gia Oden (MRN [id number])  Date of Service: 9/21/2023    Subjective:   78-year-old man history of vasectomy complaining left-sided orchialgia. I put him on 7 days of Keflex in August.  He had a follow-up ultrasound which was normal.  Felt better after antibiotics. Still gets an occasional twinge of discomfort. Reason for visit: Orchialgia follow-up    Objective:     VITALS:    There were no vitals filed for this visit. LABS:  No results found for: "HGB", "HCT", "WBC", "PLT"]    No results found for: "NA", "K", "CL", "CO2", "BUN", "CREATININE", "CALCIUM", "GLUCOSE"]        INPATIENT MEDS:    Current Outpatient Medications:   •  allopurinol (ZYLOPRIM) 300 mg tablet, Take 300 mg by mouth daily at bedtime , Disp: , Rfl:   •  colchicine (COLCRYS) 0.6 mg tablet, Take 0.6 mg by mouth as needed , Disp: , Rfl:   •  diazepam (VALIUM) 5 mg tablet, TAKE BOTH TABLETS 1 HOUR PRIOR TO THE PROCEDURE (Patient not taking: Reported on 1/5/2023), Disp: 2 tablet, Rfl: 0  •  oxyCODONE-acetaminophen (PERCOCET) 5-325 mg per tablet, Take 1 tablet by mouth every 4 (four) hours as needed for moderate pain for up to 10 dosesMax Daily Amount: 6 tablets (Patient not taking: Reported on 10/6/2022), Disp: 10 tablet, Rfl: 0      Physical Exam:   There were no vitals taken for this visit. GEN: no acute distress    RESP: breathing comfortably with no accessory muscle use    ABD: soft, non-tender, non-distended   INCISION:    EXT: no significant peripheral edema         RADIOLOGY:   SCROTAL ULTRASOUND   IMPRESSION:     Normal      Assessment:   #1.   Orchialgia    Plan:   -He may have a chronic component of epididymitis congestive postvasectomy  -He has some relief of symptoms after ejaculation  -Follow-up in 6 months and evaluate  -We did talk about a nerve block if necessary

## 2024-03-22 ENCOUNTER — OFFICE VISIT (OUTPATIENT)
Dept: UROLOGY | Facility: CLINIC | Age: 39
End: 2024-03-22
Payer: COMMERCIAL

## 2024-03-22 ENCOUNTER — TELEPHONE (OUTPATIENT)
Dept: UROLOGY | Facility: CLINIC | Age: 39
End: 2024-03-22

## 2024-03-22 VITALS
HEIGHT: 70 IN | BODY MASS INDEX: 30.92 KG/M2 | DIASTOLIC BLOOD PRESSURE: 74 MMHG | HEART RATE: 89 BPM | WEIGHT: 216 LBS | OXYGEN SATURATION: 97 % | SYSTOLIC BLOOD PRESSURE: 130 MMHG

## 2024-03-22 DIAGNOSIS — N50.811 PAIN IN RIGHT TESTICLE: Primary | ICD-10-CM

## 2024-03-22 PROCEDURE — 99213 OFFICE O/P EST LOW 20 MIN: CPT | Performed by: PHYSICIAN ASSISTANT

## 2024-03-22 RX ORDER — DOXYCYCLINE HYCLATE 100 MG/1
100 CAPSULE ORAL EVERY 12 HOURS
COMMUNITY
Start: 2024-03-08

## 2024-03-22 RX ORDER — NAPROXEN 500 MG/1
500 TABLET ORAL 2 TIMES DAILY WITH MEALS
Qty: 30 TABLET | Refills: 0 | Status: SHIPPED | OUTPATIENT
Start: 2024-03-22

## 2024-03-22 NOTE — PROGRESS NOTES
"  UROLOGY PROGRESS NOTE   Patient Identifiers: Philippe Coats (MRN [id number])  Date of Service: 3/22/2024    Subjective:   38-year-old man history of vasectomy complaining of left-sided orchialgia.  He was treated with Keflex and was better.  Ultrasound was normal.  There was concern of a chronic component of epididymitis postvasectomy.  States his pain and discomfort is worse since his last visit.  Discomfort particularly with an erection.    Reason for visit: Orchialgia follow-up    Objective:     VITALS:    There were no vitals filed for this visit.        LABS:  No results found for: \"HGB\", \"HCT\", \"WBC\", \"PLT\"]    No results found for: \"NA\", \"K\", \"CL\", \"CO2\", \"BUN\", \"CREATININE\", \"CALCIUM\", \"GLUCOSE\"]        INPATIENT MEDS:    Current Outpatient Medications:     doxycycline hyclate (VIBRAMYCIN) 100 mg capsule, Take 100 mg by mouth every 12 (twelve) hours, Disp: , Rfl:     allopurinol (ZYLOPRIM) 300 mg tablet, Take 300 mg by mouth daily at bedtime , Disp: , Rfl:     colchicine (COLCRYS) 0.6 mg tablet, Take 0.6 mg by mouth as needed , Disp: , Rfl:     ketoconazole (NIZORAL) 2 % cream, APPLY DAILY, Disp: , Rfl:       Physical Exam:   There were no vitals taken for this visit.  GEN: no acute distress    RESP: breathing comfortably with no accessory muscle use    ABD: soft, non-tender, non-distended   INCISION:    EXT: no significant peripheral edema   (Male): Penis circumcised, phallus normal, meatus patent.  Testicles descended into scrotum bilaterally without masses nor tenderness.  No inguinal hernias bilaterally.      RADIOLOGY:   none     Assessment:   #1.  Chronic right pelvic/testicular pain  #2.  Postvasectomy 1 year    Plan:   -Considering possible chronic epididymitis  -His scrotal ultrasound was normal as well as his exam  -Will get an MRI of the pelvis to include the scrotum  -Celebrex for discomfort  -Follow-up for nerve block        "

## 2024-03-22 NOTE — TELEPHONE ENCOUNTER
Scheduled. If MRI cannot be before the appointment scheduled then let me know and ill reschedule the appointment til after

## 2024-03-22 NOTE — TELEPHONE ENCOUNTER
Patient needs a nerve block with [facility] per Dagoberto. DV's prostate biopsy spots are out until July. Can patient wait that long? Also, once we have a date can someone please schedule since I cannot anymore. Thank you!      Provider note:      Return for mri pelvis now and nerve block with [facility] please.

## 2024-04-01 DIAGNOSIS — N50.811 PAIN IN RIGHT TESTICLE: ICD-10-CM

## 2024-04-02 RX ORDER — NAPROXEN 500 MG/1
TABLET ORAL
Qty: 30 TABLET | Refills: 0 | Status: SHIPPED | OUTPATIENT
Start: 2024-04-02

## 2024-04-03 ENCOUNTER — HOSPITAL ENCOUNTER (OUTPATIENT)
Dept: MRI IMAGING | Facility: HOSPITAL | Age: 39
Discharge: HOME | End: 2024-04-03
Payer: COMMERCIAL

## 2024-04-03 DIAGNOSIS — N50.811 PAIN IN RIGHT TESTICLE: ICD-10-CM

## 2024-04-03 PROCEDURE — 72197 MRI PELVIS W/O & W/DYE: CPT

## 2024-04-03 PROCEDURE — A9585 GADOBUTROL INJECTION: HCPCS | Performed by: PHYSICIAN ASSISTANT

## 2024-04-03 RX ORDER — GADOBUTROL 604.72 MG/ML
9 INJECTION INTRAVENOUS
Status: COMPLETED | OUTPATIENT
Start: 2024-04-03 | End: 2024-04-03

## 2024-04-03 RX ADMIN — GADOBUTROL 9 ML: 604.72 INJECTION INTRAVENOUS at 15:32

## 2024-04-05 PROBLEM — N50.812 PAIN IN LEFT TESTICLE: Status: ACTIVE | Noted: 2024-04-05

## 2024-04-05 PROBLEM — Z79.899 MEDICATION MANAGEMENT: Status: ACTIVE | Noted: 2024-04-05

## 2024-04-05 LAB
BUN SERPL-MCNC: 16 MG/DL (ref 6–24)
BUN/CREAT SERPL: 14 (ref 9–20)
CALCIUM SERPL-MCNC: 10 MG/DL (ref 8.7–10.2)
CHLORIDE SERPL-SCNC: 101 MMOL/L (ref 96–106)
CO2 SERPL-SCNC: 21 MMOL/L (ref 20–29)
CREAT SERPL-MCNC: 1.13 MG/DL (ref 0.76–1.27)
EGFR: 84 ML/MIN/1.73
GLUCOSE SERPL-MCNC: 86 MG/DL (ref 70–99)
POTASSIUM SERPL-SCNC: 4.3 MMOL/L (ref 3.5–5.2)
SODIUM SERPL-SCNC: 139 MMOL/L (ref 134–144)

## 2024-04-05 NOTE — PROGRESS NOTES
"     Problem List Items Addressed This Visit       Pain in left testicle - Primary     I discussed with the patient the differential diagnosis of orchialgia including infection, inflammation, neuropathic pain, testicular mass, varicocele, and spermatoceles well as epididymal cysts, in addition to referred pain.    We reviewed his imaging findings and we discussed management options including scrotal support, the use of scheduled nonsteroidal anti-inflammatory agents, and the performance of a spermatic cord and ilioinguinal nerve block which is both diagnostic and therapeutic.     MRI negative on my read, no hernias on exam, formal read pending    On exam there is tenderness at the sperm granuloma bilaterally.     He did not want a block today. I will see him in 6 months for a possible right cord block. He may end up sending us a [employer] message for gabapentin, amitriptyline, or duloxetine between now and then. I also recommended that he explore possible acupuncture and/or CBD products          Medication management     Medication reconciliation performed, as per his request I removed ketoconazole and doxycycline              I have spent a total time of 30 minutes on 04/12/24 in caring for this patient including Diagnostic results, Prognosis, Risks and benefits of tx options, Instructions for management, Patient and family education, Importance of tx compliance, Risk factor reductions, Impressions, Counseling / Coordination of care, Documenting in the medical record, Reviewing / ordering tests, medicine, procedures  , and Obtaining or reviewing history  .     Portions of the above record have been created with voice recognition software.  Occasional wrong word or \"sound alike\" substitution may have occurred due to the inherent limitations of voice recognition software.  Read the chart carefully and recognize, using context, where substitution may have occurred.    Assessment and plan:       Please see problem " oriented charting for the assessment plan of today's urological complaints      Sotero Ying MD      Chief Complaint     As listed above      History of Present Illness     Philippe Coats is a 38 y.o. man s/p vasectomy with ongoing chronic orchalgia, bilateral, right worse than left. Pain is a 2/10 today. Long discussion regarding options, he did not want a cord block today    The following portions of the patient's history were reviewed and updated as appropriate: allergies, current medications, past family history, past medical history, past social history, past surgical history and problem list.    Detailed Urologic History     - please refer to HPI    Review of Systems     Review of Systems   Constitutional: Negative.    HENT: Negative.     Eyes: Negative.    Respiratory: Negative.     Cardiovascular: Negative.    Gastrointestinal: Negative.    Endocrine: Negative.    Genitourinary:  Positive for testicular pain.   Musculoskeletal: Negative.    Skin: Negative.    Allergic/Immunologic: Negative.    Neurological: Negative.    Hematological: Negative.    Psychiatric/Behavioral: Negative.               Allergies     No Known Allergies    Physical Exam     Physical Exam  Vitals and nursing note reviewed.   Constitutional:       General: He is not in acute distress.     Appearance: Normal appearance. He is well-developed. He is not ill-appearing, toxic-appearing or diaphoretic.   HENT:      Head: Normocephalic and atraumatic.   Eyes:      General: No scleral icterus.  Pulmonary:      Effort: Pulmonary effort is normal. No respiratory distress.   Abdominal:      General: There is no distension.      Palpations: Abdomen is soft.      Tenderness: There is no abdominal tenderness.      Hernia: No hernia is present.   Genitourinary:     Penis: Normal.       Testes: Normal.      Comments: Tenderness superior to the testes at the sperm granulomata  Musculoskeletal:         General: No deformity.      Cervical back: Neck  "supple.   Skin:     Coloration: Skin is not jaundiced or pale.   Neurological:      Mental Status: He is alert and oriented to person, place, and time. Mental status is at baseline.      Cranial Nerves: No cranial nerve deficit.      Motor: No weakness.      Coordination: Coordination normal.   Psychiatric:         Mood and Affect: Mood normal.         Behavior: Behavior normal.         Thought Content: Thought content normal.         Judgment: Judgment normal.             Vital Signs  Vitals:    04/12/24 0805   BP: 128/68   BP Location: Left arm   Patient Position: Sitting   Cuff Size: Standard   Pulse: 89   SpO2: 96%   Weight: 98.4 kg (217 lb)   Height: 5' 10\" (1.778 m)         Current Medications       Current Outpatient Medications:     allopurinol (ZYLOPRIM) 300 mg tablet, Take 300 mg by mouth daily at bedtime , Disp: , Rfl:     colchicine (COLCRYS) 0.6 mg tablet, Take 0.6 mg by mouth as needed , Disp: , Rfl:     naproxen (EC NAPROSYN) 500 MG EC tablet, TAKE ONE TABLET (500 MG TOTAL) BY MOUTH 2 (TWO) TIMES A DAY WITH MEALS, Disp: 30 tablet, Rfl: 0      Active Problems     Patient Active Problem List   Diagnosis    Umbilical hernia    Pain in left testicle    Medication management         Past Medical History     Past Medical History:   Diagnosis Date    Gout          Surgical History     Past Surgical History:   Procedure Laterality Date    HERNIA REPAIR  May 2020    Umbilical    NO PAST SURGERIES      OK RPR UMBILICAL HRNA 5 YRS/> REDUCIBLE N/A 05/19/2021    Procedure: UMBILICAL HERNIA REPAIR;  Surgeon: Pascual Croft MD;  Location: [facility];  Service: General         Family History     Family History   Problem Relation Age of Onset    Cancer Father          Social History     Social History     Social History     Tobacco Use   Smoking Status Never   Smokeless Tobacco Current    Types: Chew         Pertinent Lab Values     Lab Results   Component Value Date    CREATININE 1.13 04/04/2024 "                 Pertinent Imaging       The patient's images were reviewed by me personally and also in real time with them in the examination room using our PACS imaging system.  The imaging findings are significant for normal testes and no hernias on his MRI of the pelvis (formal read is pending, however).

## 2024-04-12 ENCOUNTER — TELEPHONE (OUTPATIENT)
Dept: UROLOGY | Facility: CLINIC | Age: 39
End: 2024-04-12

## 2024-04-12 ENCOUNTER — PROCEDURE VISIT (OUTPATIENT)
Dept: UROLOGY | Facility: CLINIC | Age: 39
End: 2024-04-12
Payer: COMMERCIAL

## 2024-04-12 VITALS
DIASTOLIC BLOOD PRESSURE: 68 MMHG | OXYGEN SATURATION: 96 % | HEART RATE: 89 BPM | WEIGHT: 217 LBS | HEIGHT: 70 IN | SYSTOLIC BLOOD PRESSURE: 128 MMHG | BODY MASS INDEX: 31.07 KG/M2

## 2024-04-12 DIAGNOSIS — N50.812 PAIN IN LEFT TESTICLE: Primary | ICD-10-CM

## 2024-04-12 DIAGNOSIS — Z79.899 MEDICATION MANAGEMENT: ICD-10-CM

## 2024-04-12 PROCEDURE — 99214 OFFICE O/P EST MOD 30 MIN: CPT | Performed by: UROLOGY

## 2024-04-12 NOTE — ASSESSMENT & PLAN NOTE
I discussed with the patient the differential diagnosis of orchialgia including infection, inflammation, neuropathic pain, testicular mass, varicocele, and spermatoceles well as epididymal cysts, in addition to referred pain.    We reviewed his imaging findings and we discussed management options including scrotal support, the use of scheduled nonsteroidal anti-inflammatory agents, and the performance of a spermatic cord and ilioinguinal nerve block which is both diagnostic and therapeutic.     MRI negative on my read, no hernias on exam, formal read pending    On exam there is tenderness at the sperm granuloma bilaterally.     He did not want a block today. I will see him in 6 months for a possible right cord block. He may end up sending us a [employer] message for gabapentin, amitriptyline, or duloxetine between now and then. I also recommended that he explore possible acupuncture and/or CBD products

## 2024-04-12 NOTE — TELEPHONE ENCOUNTER
Please schedule patient with [facility] on 10/16 at 830 for cord block. Thank you!            Return in about 6 months (around 10/12/2024) for Dr LONDONO, possible cord block.

## 2024-07-05 ENCOUNTER — OFFICE VISIT (OUTPATIENT)
Dept: URGENT CARE | Age: 39
End: 2024-07-05
Payer: COMMERCIAL

## 2024-07-05 ENCOUNTER — APPOINTMENT (EMERGENCY)
Dept: RADIOLOGY | Facility: HOSPITAL | Age: 39
End: 2024-07-05
Payer: COMMERCIAL

## 2024-07-05 ENCOUNTER — HOSPITAL ENCOUNTER (EMERGENCY)
Facility: HOSPITAL | Age: 39
Discharge: HOME | End: 2024-07-05
Attending: EMERGENCY MEDICINE
Payer: COMMERCIAL

## 2024-07-05 VITALS
OXYGEN SATURATION: 99 % | TEMPERATURE: 96.1 F | DIASTOLIC BLOOD PRESSURE: 80 MMHG | HEART RATE: 87 BPM | BODY MASS INDEX: 29.96 KG/M2 | WEIGHT: 214 LBS | SYSTOLIC BLOOD PRESSURE: 116 MMHG | HEIGHT: 71 IN | RESPIRATION RATE: 16 BRPM

## 2024-07-05 VITALS
OXYGEN SATURATION: 95 % | HEART RATE: 80 BPM | RESPIRATION RATE: 18 BRPM | TEMPERATURE: 98.2 F | SYSTOLIC BLOOD PRESSURE: 108 MMHG | DIASTOLIC BLOOD PRESSURE: 86 MMHG

## 2024-07-05 DIAGNOSIS — S68.115A TRAUMATIC AMPUTATION OF LEFT RING FINGER: Primary | ICD-10-CM

## 2024-07-05 DIAGNOSIS — S61.315A LACERATION OF LEFT RING FINGER WITHOUT FOREIGN BODY WITH DAMAGE TO NAIL, INITIAL ENCOUNTER: Primary | ICD-10-CM

## 2024-07-05 PROCEDURE — 99283 EMERGENCY DEPT VISIT LOW MDM: CPT

## 2024-07-05 PROCEDURE — 90471 IMMUNIZATION ADMIN: CPT

## 2024-07-05 PROCEDURE — 99285 EMERGENCY DEPT VISIT HI MDM: CPT | Performed by: EMERGENCY MEDICINE

## 2024-07-05 PROCEDURE — 73140 X-RAY EXAM OF FINGER(S): CPT

## 2024-07-05 PROCEDURE — S9083 URGENT CARE CENTER GLOBAL: HCPCS | Performed by: NURSE PRACTITIONER

## 2024-07-05 PROCEDURE — 96376 TX/PRO/DX INJ SAME DRUG ADON: CPT

## 2024-07-05 PROCEDURE — NC001 PR NO CHARGE: Performed by: ORTHOPAEDIC SURGERY

## 2024-07-05 PROCEDURE — 96365 THER/PROPH/DIAG IV INF INIT: CPT

## 2024-07-05 PROCEDURE — G0382 LEV 3 HOSP TYPE B ED VISIT: HCPCS | Performed by: NURSE PRACTITIONER

## 2024-07-05 PROCEDURE — 96361 HYDRATE IV INFUSION ADD-ON: CPT

## 2024-07-05 PROCEDURE — 90715 TDAP VACCINE 7 YRS/> IM: CPT

## 2024-07-05 PROCEDURE — 96372 THER/PROPH/DIAG INJ SC/IM: CPT | Performed by: NURSE PRACTITIONER

## 2024-07-05 PROCEDURE — 96375 TX/PRO/DX INJ NEW DRUG ADDON: CPT

## 2024-07-05 RX ORDER — AMOXICILLIN AND CLAVULANATE POTASSIUM 875; 125 MG/1; MG/1
1 TABLET, FILM COATED ORAL EVERY 12 HOURS
Qty: 14 TABLET | Refills: 0 | Status: SHIPPED | OUTPATIENT
Start: 2024-07-05 | End: 2024-07-12

## 2024-07-05 RX ORDER — CEFAZOLIN SODIUM 1 G/50ML
1000 SOLUTION INTRAVENOUS ONCE
Status: COMPLETED | OUTPATIENT
Start: 2024-07-05 | End: 2024-07-05

## 2024-07-05 RX ORDER — CEPHALEXIN 500 MG/1
500 CAPSULE ORAL EVERY 6 HOURS SCHEDULED
Qty: 28 CAPSULE | Refills: 0 | Status: CANCELLED | OUTPATIENT
Start: 2024-07-05 | End: 2024-07-12

## 2024-07-05 RX ORDER — LIDOCAINE HYDROCHLORIDE 10 MG/ML
10 INJECTION, SOLUTION EPIDURAL; INFILTRATION; INTRACAUDAL; PERINEURAL ONCE
Status: COMPLETED | OUTPATIENT
Start: 2024-07-05 | End: 2024-07-05

## 2024-07-05 RX ORDER — DEXAMETHASONE SODIUM PHOSPHATE 10 MG/ML
8 INJECTION, SOLUTION INTRAMUSCULAR; INTRAVENOUS ONCE
Status: COMPLETED | OUTPATIENT
Start: 2024-07-05 | End: 2024-07-05

## 2024-07-05 RX ORDER — FENTANYL CITRATE 50 UG/ML
25 INJECTION, SOLUTION INTRAMUSCULAR; INTRAVENOUS ONCE AS NEEDED
Status: COMPLETED | OUTPATIENT
Start: 2024-07-05 | End: 2024-07-05

## 2024-07-05 RX ORDER — KETOROLAC TROMETHAMINE 30 MG/ML
15 INJECTION, SOLUTION INTRAMUSCULAR; INTRAVENOUS ONCE
Status: COMPLETED | OUTPATIENT
Start: 2024-07-05 | End: 2024-07-05

## 2024-07-05 RX ORDER — OXYCODONE HYDROCHLORIDE 5 MG/1
5 TABLET ORAL EVERY 4 HOURS PRN
Qty: 10 TABLET | Refills: 0 | Status: SHIPPED | OUTPATIENT
Start: 2024-07-05

## 2024-07-05 RX ORDER — FENTANYL CITRATE 50 UG/ML
50 INJECTION, SOLUTION INTRAMUSCULAR; INTRAVENOUS ONCE
Status: COMPLETED | OUTPATIENT
Start: 2024-07-05 | End: 2024-07-05

## 2024-07-05 RX ORDER — LIDOCAINE HYDROCHLORIDE 10 MG/ML
20 INJECTION, SOLUTION EPIDURAL; INFILTRATION; INTRACAUDAL; PERINEURAL ONCE
Status: COMPLETED | OUTPATIENT
Start: 2024-07-05 | End: 2024-07-05

## 2024-07-05 RX ADMIN — DEXAMETHASONE SODIUM PHOSPHATE 8 MG: 10 INJECTION, SOLUTION INTRAMUSCULAR; INTRAVENOUS at 14:19

## 2024-07-05 RX ADMIN — LIDOCAINE HYDROCHLORIDE 20 ML: 10 INJECTION, SOLUTION EPIDURAL; INFILTRATION; INTRACAUDAL; PERINEURAL at 19:20

## 2024-07-05 RX ADMIN — LIDOCAINE HYDROCHLORIDE 10 ML: 10 INJECTION, SOLUTION EPIDURAL; INFILTRATION; INTRACAUDAL; PERINEURAL at 17:20

## 2024-07-05 RX ADMIN — TETANUS TOXOID, REDUCED DIPHTHERIA TOXOID AND ACELLULAR PERTUSSIS VACCINE, ADSORBED 0.5 ML: 5; 2.5; 8; 8; 2.5 SUSPENSION INTRAMUSCULAR at 17:50

## 2024-07-05 RX ADMIN — CEFAZOLIN SODIUM 1000 MG: 1 SOLUTION INTRAVENOUS at 17:51

## 2024-07-05 RX ADMIN — FENTANYL CITRATE 50 MCG: 50 INJECTION INTRAMUSCULAR; INTRAVENOUS at 17:20

## 2024-07-05 RX ADMIN — KETOROLAC TROMETHAMINE 15 MG: 30 INJECTION, SOLUTION INTRAMUSCULAR at 22:03

## 2024-07-05 RX ADMIN — SODIUM CHLORIDE 1000 ML: 0.9 INJECTION, SOLUTION INTRAVENOUS at 19:10

## 2024-07-05 RX ADMIN — FENTANYL CITRATE 25 MCG: 50 INJECTION INTRAMUSCULAR; INTRAVENOUS at 19:20

## 2024-07-05 NOTE — ED PROVIDER NOTES
History  Chief Complaint   Patient presents with    Finger Laceration     Pt was sent from urgent care after cutting his L ring finger with a wood splitter.     Patient is a 38-year-old male with past medical history of gout.  He was splitting wood with a hydraulic log splitter and got his finger caught between the log and the log splitter.  Resulted in traumatic amputation of his distal phalanx on left ring finger.  Patient went to urgent care where they gave him Decadron and referred him to the emergency department.        Prior to Admission Medications   Prescriptions Last Dose Informant Patient Reported? Taking?   allopurinol (ZYLOPRIM) 300 mg tablet  Self Yes No   Sig: Take 300 mg by mouth daily at bedtime    colchicine (COLCRYS) 0.6 mg tablet  Self Yes No   Sig: Take 0.6 mg by mouth as needed    naproxen (EC NAPROSYN) 500 MG EC tablet   No No   Sig: TAKE ONE TABLET (500 MG TOTAL) BY MOUTH 2 (TWO) TIMES A DAY WITH MEALS      Facility-Administered Medications Last Administration Doses Remaining   dexamethasone (PF) (DECADRON) injection 8 mg 7/5/2024  2:19 PM 0          Past Medical History:   Diagnosis Date    Gout        Past Surgical History:   Procedure Laterality Date    HERNIA REPAIR  May 2020    Umbilical    NO PAST SURGERIES      [place] RPR UMBILICAL HRNA 5 YRS/> REDUCIBLE N/A 05/19/2021    Procedure: UMBILICAL HERNIA REPAIR;  Surgeon: Pascual Croft MD;  [facility];  Service: General       Family History   Problem Relation Age of Onset    Cancer Father      I have reviewed and agree with the history as documented.    E-Cigarette/Vaping    E-Cigarette Use Never User      E-Cigarette/Vaping Substances    Nicotine No     THC No     CBD No     Flavoring No     Other No     Unknown No      Social History     Tobacco Use    Smoking status: Never    Smokeless tobacco: Current     Types: Chew   Vaping Use    Vaping status: Never Used   Substance Use Topics    Alcohol use: Yes     Alcohol/week: 3.0 standard  drinks of alcohol     Types: 3 Cans of beer per week     Comment: social    Drug use: Never        Review of Systems   Eyes:  Negative for visual disturbance.   Respiratory:  Negative for shortness of breath.    Gastrointestinal:  Negative for abdominal pain and vomiting.   Neurological:  Negative for syncope.   All other systems reviewed and are negative.      Physical Exam  ED Triage Vitals [07/05/24 1431]   Temperature Pulse Respirations Blood Pressure SpO2   98.2 °F (36.8 °C) 80 18 108/86 95 %      Temp Source Heart Rate Source Patient Position - Orthostatic VS BP Location FiO2 (%)   Oral Monitor Sitting Right arm --      Pain Score       7             Orthostatic Vital Signs  Vitals:    07/05/24 1431   BP: 108/86   Pulse: 80   Patient Position - Orthostatic VS: Sitting       Physical Exam  Vitals reviewed.   Constitutional:       General: He is not in acute distress.     Appearance: Normal appearance. He is not ill-appearing, toxic-appearing or diaphoretic.   Eyes:      General: No scleral icterus.  Pulmonary:      Effort: No tachypnea, bradypnea or accessory muscle usage.      Breath sounds: Normal breath sounds.   Musculoskeletal:         General: Signs of injury present.        Arms:    Skin:     General: Skin is warm and dry.   Neurological:      Mental Status: He is alert and oriented to person, place, and time.      GCS: GCS eye subscore is 4. GCS verbal subscore is 5. GCS motor subscore is 6.      Cranial Nerves: No dysarthria or facial asymmetry.   Psychiatric:         Mood and Affect: Mood normal.         Behavior: Behavior normal. Behavior is cooperative.         Thought Content: Thought content normal.         Judgment: Judgment normal.         ED Medications  Medications   tetanus-diphtheria-acellular pertussis (BOOSTRIX) IM injection 0.5 mL (has no administration in time range)   ceFAZolin (ANCEF) IVPB (premix in dextrose) 1,000 mg 50 mL (has no administration in time range)   lidocaine (PF)  (XYLOCAINE-MPF) 1 % injection 10 mL (has no administration in time range)   fentaNYL injection 50 mcg (has no administration in time range)       Diagnostic Studies  Results Reviewed       None                   XR finger fourth digit-ring LEFT    (Results Pending)         Procedures  Procedures      ED Course  ED Course as of 07/05/24 2046 Fri Jul 05, 2024   1600 Checked on patient to ensure pain is under control   1800 Checked on patient to ensure pain is under control   1924 Patient requesting more pain medication we will do 25 mcs of fentanyl                             SBIRT 20yo+      Flowsheet Row Most Recent Value   Initial Alcohol Screen: US AUDIT-C     1. How often do you have a drink containing alcohol? 0 Filed at: 07/05/2024 1433   2. How many drinks containing alcohol do you have on a typical day you are drinking?  0 Filed at: 07/05/2024 1433   3a. Male UNDER 65: How often do you have five or more drinks on one occasion? 0 Filed at: 07/05/2024 1433   3b. FEMALE Any Age, or MALE 65+: How often do you have 4 or more drinks on one occassion? 0 Filed at: 07/05/2024 1433   Audit-C Score 0 Filed at: 07/05/2024 1433   NOY: How many times in the past year have you...    Used an illegal drug or used a prescription medication for non-medical reasons? Never Filed at: 07/05/2024 1433                  Medical Decision Making  Patient has a partially amputated distal phalanx on his left ring finger.  We will order fentanyl for pain control Ancef 1 g due to contaminated wound as well as a tetanus as his last tetanus is unknown.  We will x-ray his hand to determine amount of bone loss if any.  Orthopedics has been consulted to evaluate his hand to see if there needs operative management and further course will depend on orthopedics recommendations.  Orthopedic hand is currently closing patient's finger.  We will follow their instructions and follow-up as to management of this injury    Amount and/or Complexity of  Data Reviewed  Radiology: ordered and independent interpretation performed.    Risk  Prescription drug management.          Disposition  Final diagnoses:   Traumatic amputation of left ring finger     Time reflects when diagnosis was documented in both MDM as applicable and the Disposition within this note       Time User Action Codes Description Comment    7/5/2024  4:57 PM Mustapha Tomas Add [S68.115A] Traumatic amputation of left ring finger           ED Disposition       None          Follow-up Information    None         Patient's Medications   Discharge Prescriptions    No medications on file     No discharge procedures on file.    PDMP Review       None             ED Provider  Attending physically available and evaluated Philippe Coats. I managed the patient along with the ED Attending.    Electronically Signed by           Mustapha Tomas MD  07/05/24 0149

## 2024-07-05 NOTE — ED ATTENDING ATTESTATION
7/5/2024  I, Karishma Phillips MD, saw and evaluated the patient. I have discussed the patient with the resident/non-physician practitioner and agree with the resident's/non-physician practitioner's findings, Plan of Care, and MDM as documented in the resident's/non-physician practitioner's note, except where noted. All available labs and Radiology studies were reviewed.  I was present for key portions of any procedure(s) performed by the resident/non-physician practitioner and I was immediately available to provide assistance.       At this point I agree with the current assessment done in the Emergency Department.  I have conducted an independent evaluation of this patient a history and physical is as follows:    ED Course         Critical Care Time  Procedures    37 yo male with left ring finger injured in wood splinter. Pt sent from urgent care.  No blood thinners, unknown tetanus.  Vss, afebrile, lungs cta, rrr, left ring finger with tenderness, active oozing of blood, distal tip amputated. Xray, tetanus, abx, pain meds, consult ortho, digital block.

## 2024-07-05 NOTE — ED NOTES
Pt sitting with wife in waiting room awaiting a bed in the ED     Rylee Stovall, RN  07/05/24 1792

## 2024-07-05 NOTE — CONSULTS
Orthopedics   Philippe Coats 38 y.o. male MRN: [id number]  Unit/Bed#: X ray      Chief Complaint:   Left ring finger pain    HPI:  38 y.o. male right hand dominant who presents with a chief complaint of left ring finger pain after getting it caught in a wood splitter. He denies any other injuries at this time. No anticoagulation. Medical history significant for gout. Surgical history non-contributory.     Review Of Systems:   Skin: Normal  Neuro: See HPI  Musculoskeletal: See HPI  14 point review of systems negative except as stated above     Past Medical History:   Past Medical History:   Diagnosis Date    Gout        Past Surgical History:   Past Surgical History:   Procedure Laterality Date    HERNIA REPAIR  May 2020    Umbilical    NO PAST SURGERIES      [place] RPR UMBILICAL HRNA 5 YRS/> REDUCIBLE N/A 05/19/2021    Procedure: UMBILICAL HERNIA REPAIR;  Surgeon: Pascual Croft MD;  [facility];  Service: General       Family History:  Family history reviewed and non-contributory  Family History   Problem Relation Age of Onset    Cancer Father        Social History:  Social History     Socioeconomic History    Marital status: Single     Spouse name: None    Number of children: None    Years of education: None    Highest education level: None   Occupational History    None   Tobacco Use    Smoking status: Never    Smokeless tobacco: Current     Types: Chew   Vaping Use    Vaping status: Never Used   Substance and Sexual Activity    Alcohol use: Yes     Alcohol/week: 3.0 standard drinks of alcohol     Types: 3 Cans of beer per week     Comment: social    Drug use: Never    Sexual activity: Yes     Partners: Female     Birth control/protection: Condom Male   Other Topics Concern    None   Social History Narrative    None     Social Determinants of Health     Financial Resource Strain: Not on file   Food Insecurity: Not on file   Transportation Needs: Not on file   Physical Activity: Not on file   Stress: Not on  "file   Social Connections: Not on file   Intimate Partner Violence: Not on file   Housing Stability: Not on file       Allergies:   No Known Allergies        Labs:  No results found for: \"HCT\", \"HGB\", \"PT\", \"INR\", \"WBC\", \"ESR\", \"CRP\"    Meds:  No current facility-administered medications for this encounter.    Current Outpatient Medications:     allopurinol (ZYLOPRIM) 300 mg tablet, Take 300 mg by mouth daily at bedtime , Disp: , Rfl:     colchicine (COLCRYS) 0.6 mg tablet, Take 0.6 mg by mouth as needed , Disp: , Rfl:     naproxen (EC NAPROSYN) 500 MG EC tablet, TAKE ONE TABLET (500 MG TOTAL) BY MOUTH 2 (TWO) TIMES A DAY WITH MEALS, Disp: 30 tablet, Rfl: 0    Blood Culture:   No results found for: \"BLOODCX\"    Wound Culture:   No results found for: \"WOUNDCULT\"    Ins and Outs:  No intake/output data recorded.          Physical Exam:   /86 (BP Location: Right arm)   Pulse 80   Temp 98.2 °F (36.8 °C) (Oral)   Resp 18   SpO2 95%   Gen: No acute distress, resting comfortably in bed  HEENT: Eyes clear, moist mucus membranes, hearing intact  Respiratory: No audible wheezing or stridor  Cardiovascular: Well Perfused peripherally, 2+ distal pulse  Abdomen: nondistended, no peritoneal signs  Musculoskeletal: left Upper Extremity extremity  Distal left ring finger with large soft tissue obliquely angled wound toward the ulnar aspect of the finger that crosses the nail-bed. Nail is absent. Germinal matrix intact. Finger is warm and well perfused. Bone not obviously visible.  Significant tenderness to palpation over the wound. Bone is palpated within the wound.  Sensation intact down to 5 mm of 2-point discrimination  Flexion intact to DIP joint  Extremity is warm and well perfused with capillary refill < 2 seconds.    Tertiary: no tenderness over all other joints/long bones as except already stated.      Radiology:     XR demonstrates no evidence of fracture, dislocation, or other obvious acute traumatic osseous " abnormalities. Obvious soft tissue defect over the distal phalanx of the ring finger.      I personally reviewed the films.      Procedure- Orthopedics   Philippe Coats 38 y.o. male MRN: [id number]  Unit/Bed#:     Procedure: Revision amputation of left ring finger distal phalanx  Local anesthesia was give via a digital block with 20 cc of 1% lidocaine without epi.  Once adequate anesthesia was obtained the wound was then copiously irrigated with 3L of NS. The area was then sterilely prepped and draped. Remaining soft tissue was debrided until a flap could be closed over the distal phalanx.  Wound was closed with 4-0 chromic gut. Nail bed splint was fashioned out of foil and applied between the eponychium and the nail bed. Stump was then dressed with xeroform, 4x4 gauze, yolanda. Pt tolerated the procedure well and was neurovascularly intact pre and post procedure.         Assessment:  38 y.o.male with traumatic amputation of the left ring finger status post revision amputation and nail bed splint application..     Plan:   Maintain dressings  Analgesics for pain  Tetanus updated  Augmentin x7 days  F/U w/ hand surgery in 1 week      Phil Russell MD    Case and plan was discussed with senior resident who is in agreement.     Adult

## 2024-07-05 NOTE — PROGRESS NOTES
[facility]        NAME: Philippe Coats is a 38 y.o. male  : 1985    MRN: [id number]  DATE: 2024  TIME: 2:03 PM    Assessment and Plan   Laceration of left ring finger without foreign body with damage to nail, initial encounter [S61.315A]  1. Laceration of left ring finger without foreign body with damage to nail, initial encounter  Transfer to other facility    dexamethasone (PF) (DECADRON) injection 8 mg            Patient Instructions       Steroid injection administered at the clinic as is only suitable med for pain available  Sent to ED for further eval        If tests have been performed at [facility], our office will contact you with results if changes need to be made to the care plan discussed with you at the visit.  [facility]    Chief Complaint     Chief Complaint   Patient presents with    Finger Laceration     Lef 4th digit cut in wood splitter         History of Present Illness       HPI  Reports he was using a wood  and accidentally injured the left 4th finger. Has been bleeding since. Started l;ess than 2 hrs ago. He is in severe pain. Also continuous bleeding.     Review of Systems   Review of Systems   Constitutional:  Negative for fever.   Skin:  Positive for wound (left finger). Negative for pallor.   Neurological:  Negative for numbness.         Current Medications       Current Outpatient Medications:     allopurinol (ZYLOPRIM) 300 mg tablet, Take 300 mg by mouth daily at bedtime , Disp: , Rfl:     colchicine (COLCRYS) 0.6 mg tablet, Take 0.6 mg by mouth as needed , Disp: , Rfl:     naproxen (EC NAPROSYN) 500 MG EC tablet, TAKE ONE TABLET (500 MG TOTAL) BY MOUTH 2 (TWO) TIMES A DAY WITH MEALS, Disp: 30 tablet, Rfl: 0    Current Facility-Administered Medications:     dexamethasone (PF) (DECADRON) injection 8 mg, 8 mg, Intramuscular, Once,     Current Allergies     Allergies as of 2024    (No Known Allergies)  "           The following portions of the patient's history were reviewed and updated as appropriate: allergies, current medications, past family history, past medical history, past social history, past surgical history and problem list.     Past Medical History:   Diagnosis Date    Gout        Past Surgical History:   Procedure Laterality Date    HERNIA REPAIR  May 2020    Umbilical    NO PAST SURGERIES      [place] RPR UMBILICAL HRNA 5 YRS/> REDUCIBLE N/A 05/19/2021    Procedure: UMBILICAL HERNIA REPAIR;  Surgeon: Pascual Croft MD;  [facility];  Service: General       Family History   Problem Relation Age of Onset    Cancer Father          Medications have been verified.        Objective   /80   Pulse 87   Temp (!) 96.1 °F (35.6 °C)   Resp 16   Ht 5' 10.5\" (1.791 m)   Wt 97.1 kg (214 lb)   SpO2 99%   BMI 30.27 kg/m²   No LMP for male patient.       Physical Exam     Physical Exam  Constitutional:       General: He is in acute distress.   Skin:     Findings: No bruising or erythema.      Comments: The fingernail of the 4th finger, left hand, was damaged and off the finger. The tip of the finger is cut off. Bleeding. Very tender to palpation                   "

## 2024-07-12 VITALS
BODY MASS INDEX: 30.64 KG/M2 | SYSTOLIC BLOOD PRESSURE: 122 MMHG | DIASTOLIC BLOOD PRESSURE: 84 MMHG | WEIGHT: 214 LBS | HEIGHT: 70 IN

## 2024-07-12 DIAGNOSIS — S68.115A TRAUMATIC AMPUTATION OF LEFT RING FINGER: Primary | ICD-10-CM

## 2024-07-12 PROCEDURE — 99214 OFFICE O/P EST MOD 30 MIN: CPT | Performed by: ORTHOPAEDIC SURGERY

## 2024-07-12 NOTE — PROGRESS NOTES
The HAND & UPPER EXTREMITY OFFICE VISIT   Referred By:  Karishma Phillips Md  [address] [place] [id number]      Chief Complaint:     Left ring finger amputation  DOI 07/05/2024    History of Present Illness:   38 y.o., Right hand dominant male presents for consultation of left ring finger amputation referred to me by the ED. He sustained an injury on 07/05/2024 when splitting wood and getting his finger caught in a hydraulic log splitter. He reports nail fell off in the wood splitter. He is compliant with his Augmentin with one dose left. He reports severe pain at the tip of the ring finger. He also reports pain in the long and small finger but they were not injured in the accident. He was consulted by an orthopedic resident in the ED. ED and consult notes reviewed. He denies any numbness or tingling at the ring finger tip. Not bleeding or draining through bandage. Patient denies any fevers or chills. He is taking ibuprofen and Tylenol, oxycodone to sleep.    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[employer]       Past Medical History:  Past Medical History:   Diagnosis Date    Gout      Past Surgical History:   Procedure Laterality Date    HERNIA REPAIR  May 2020    Umbilical    NO PAST SURGERIES      [place] RPR UMBILICAL HRNA 5 YRS/> REDUCIBLE N/A 05/19/2021    Procedure: UMBILICAL HERNIA REPAIR;  Surgeon: Pascual Croft MD;  [facility];  Service: General     Family History   Problem Relation Age of Onset    Cancer Father      Social History     Socioeconomic History    Marital status: Single     Spouse name: Not on file    Number of children: Not on file    Years of education: Not on file    Highest education level: Not on file   Occupational History    Not on file   Tobacco Use    Smoking status: Never    Smokeless tobacco: Current     Types: Chew   Vaping Use    Vaping status: Never Used   Substance and Sexual Activity    Alcohol use: Yes      "Alcohol/week: 3.0 standard drinks of alcohol     Types: 3 Cans of beer per week     Comment: social    Drug use: Never    Sexual activity: Yes     Partners: Female     Birth control/protection: Condom Male   Other Topics Concern    Not on file   Social History Narrative    Not on file     Social Determinants of Health     Financial Resource Strain: Not on file   Food Insecurity: Not on file   Transportation Needs: Not on file   Physical Activity: Not on file   Stress: Not on file   Social Connections: Not on file   Intimate Partner Violence: Not on file   Housing Stability: Not on file     Scheduled Meds:  Continuous Infusions:No current facility-administered medications for this visit.    PRN Meds:.  No Known Allergies        Physical Examination:    /84   Ht 5' 10\" (1.778 m)   Wt 97.1 kg (214 lb)   BMI 30.71 kg/m²     Gen: A&Ox3, NAD  Cardiac: regular rate  Chest: non labored breathing  Abdomen: Non-distended      Left Upper Extremity:  Dressing with dried sanguinous fluid.  Removed.  Nailbed laceration repair intact with chromic sutures.  Nail foil sutured underneath the eponychial fold.  No signs of infection.  Severe tenderness at tip of ring finger.  Intact FDP interval attempted but minimal motion due to pain at the DIP joint  Sensation intact to light touch on the radial and ulnar aspects of the ring finger  2+RP      Studies:  Radiographs: I personally reviewed and independently interpreted the available radiographs.  07/05/2024: Radiographs of the left fourth finger, multiple views, demonstrate soft tissue amputation on ring finger. No acute fracture or dislocation.      Assessment and Plan:  1. Traumatic amputation of left ring finger  Ambulatory Referral to Orthopedic Surgery          38 y.o. male presents with signs and symptoms consistent with the above diagnosis.  We discussed the natural history of this condition and its pathogenesis.  We discussed operative and nonoperative treatment " options. I explained based on his exam today we can proceed with non-operative treatment.  I explained it can take 1-2 months for his finger to form new skin over the wound. I explained his nail can take over 6 months to grow out. I anticipate his pain and sensitivity will decrease over the next 3-4 weeks. Wound was dressed with adaptic and gauze. Extra adaptic and gauze provided to the patient. Patient was instructed to perform daily bandage changes. Patient was provided with a finger tip protector to use as needed as he does not have a fracture.     Patient denied work note during today's visit. He may use Tylenol and Ibuprofen as needed for pain. He should start to wean off the oxycodone.      We discussed management of his prescription antibiotics.  He should finish his Augmentin as prescribed to prevent infection.  Take with food to decrease stomach upset.     He was instructed to call if he notices any redness, heat, fevers, chills, or other signs of infection. He is to follow-up in 2 weeks for re-evaluation and treatment. he expressed understanding of the plan and agreed. We encouraged them to contact our office with any questions or concerns.         Gustabo Nguyen MD  Hand and Upper Extremity Surgery        *This note was dictated using Dragon voice recognition software. Please excuse any word substitutions or errors.*

## 2024-07-26 ENCOUNTER — OFFICE VISIT (OUTPATIENT)
Dept: OBGYN CLINIC | Facility: CLINIC | Age: 39
End: 2024-07-26
Payer: COMMERCIAL

## 2024-07-26 VITALS
SYSTOLIC BLOOD PRESSURE: 125 MMHG | BODY MASS INDEX: 30.64 KG/M2 | HEIGHT: 70 IN | DIASTOLIC BLOOD PRESSURE: 78 MMHG | WEIGHT: 214 LBS

## 2024-07-26 DIAGNOSIS — S68.115A TRAUMATIC AMPUTATION OF LEFT RING FINGER: Primary | ICD-10-CM

## 2024-07-26 PROCEDURE — 99213 OFFICE O/P EST LOW 20 MIN: CPT | Performed by: ORTHOPAEDIC SURGERY

## 2024-07-26 NOTE — PROGRESS NOTES
HAND & UPPER EXTREMITY OFFICE VISIT   Referred By:  No referring provider defined for this encounter.      Chief Complaint:     Left ring finger amputation  DOI 07/05/2024    Previous History:   Patient was last seen 7/12/2024 and he was to continue non operative treatment for his finger. His wound was redressed.     Interval History:  Since last visit patient states he has been changing his bandages daily. He states he has minimal pain. He does report a stabbing pain that woke him up last night. This pain would come and go for a few minutes    Past Medical History:  Past Medical History:   Diagnosis Date    Gout      Past Surgical History:   Procedure Laterality Date    HERNIA REPAIR  May 2020    Umbilical    NO PAST SURGERIES      [place] RPR UMBILICAL HRNA 5 YRS/> REDUCIBLE N/A 05/19/2021    Procedure: UMBILICAL HERNIA REPAIR;  Surgeon: Pascual Croft MD;  Location: [facility];  Service: General     Family History   Problem Relation Age of Onset    Cancer Father      Social History     Socioeconomic History    Marital status: Single     Spouse name: Not on file    Number of children: Not on file    Years of education: Not on file    Highest education level: Not on file   Occupational History    Not on file   Tobacco Use    Smoking status: Never    Smokeless tobacco: Current     Types: Chew   Vaping Use    Vaping status: Never Used   Substance and Sexual Activity    Alcohol use: Yes     Alcohol/week: 3.0 standard drinks of alcohol     Types: 3 Cans of beer per week     Comment: social    Drug use: Never    Sexual activity: Yes     Partners: Female     Birth control/protection: Condom Male   Other Topics Concern    Not on file   Social History Narrative    Not on file     Social Determinants of Health     Financial Resource Strain: Not on file   Food Insecurity: Not on file   Transportation Needs: Not on file   Physical Activity: Not on file   Stress: Not on file   Social Connections: Not on file   Intimate Partner  "Violence: Not on file   Housing Stability: Not on file     Scheduled Meds:  Continuous Infusions:No current facility-administered medications for this visit.    PRN Meds:.  No Known Allergies    Physical Examination:    /78   Ht 5' 10\" (1.778 m)   Wt 97.1 kg (214 lb)   BMI 30.71 kg/m²     Gen: A&Ox3, NAD  Cardiac: regular rate  Chest: non labored breathing  Abdomen: Non-distended    Left Upper Extremity:  Healing ring fingertip with interval reepithelialization of part of his partial amputation.  Tender with diminished sensation of the fingertip  Decreased active motion at the DIP joint  Some early nail regrowth proximally  Warm well-perfused finger    Studies:    No new images obtained          Assessment and Plan:  1. Traumatic amputation of left ring finger            38 y.o. male presents in follow up for the above diagnosis.     Wound is healing well.  He should keep up with the bandage changes until his wound closes up  I will give him a compression sleeve to help pad and shape his finger. He may use this in a few weeks as he still has some healing needed before he can use it  His finger pain most likely was his nerves firing as they recover  His numbness and swelling may take months to resolve as well  His finger was redressed with Xeroform and guaze    I recommend they follow up Return in about 4 weeks (around 8/23/2024).  he expressed understanding of the plan and agreed. We encouraged them to contact our office with any questions or concerns.       Gustabo Nguyen MD  Hand and Upper Extremity Surgery      *This note was dictated using Dragon voice recognition software. Please excuse any word substitutions or errors.*  "

## 2024-08-05 ENCOUNTER — TELEPHONE (OUTPATIENT)
Age: 39
End: 2024-08-05

## 2024-08-05 NOTE — TELEPHONE ENCOUNTER
Caller: Felton    Doctor: Patrick    Reason for call: Patient changed bandage yesterday and noticed some yellow fluid on the bandage. Patient wanted to know if you think it could be a possible infection.  I asked patient to upload a photo if possible.   Patient Will try to upload a picture to [facility].  Please call to discuss  Thank you  Call back#: [phone]

## 2024-08-23 ENCOUNTER — OFFICE VISIT (OUTPATIENT)
Dept: OBGYN CLINIC | Facility: CLINIC | Age: 39
End: 2024-08-23
Payer: COMMERCIAL

## 2024-08-23 VITALS
SYSTOLIC BLOOD PRESSURE: 131 MMHG | HEIGHT: 70 IN | WEIGHT: 214 LBS | DIASTOLIC BLOOD PRESSURE: 78 MMHG | BODY MASS INDEX: 30.64 KG/M2

## 2024-08-23 DIAGNOSIS — S68.115A TRAUMATIC AMPUTATION OF LEFT RING FINGER: Primary | ICD-10-CM

## 2024-08-23 PROCEDURE — 99213 OFFICE O/P EST LOW 20 MIN: CPT | Performed by: ORTHOPAEDIC SURGERY

## 2024-08-23 NOTE — PROGRESS NOTES
HAND & UPPER EXTREMITY OFFICE VISIT   Referred By:  No referring provider defined for this encounter.      Chief Complaint:     Left ring finger pressure amputation    Previous History:   Injury on 7/5/2024.  Last seen on 7/26/2024 was continuing wound care.    Interval History:  Since last visit he reports he has made a lot of improvements with his finger and is now almost completely closed with new skin.  His tenderness has almost resolved and his nail is regrowing.  He still has some decrease sensation at the tip.  He is been working without restrictions.    Past Medical History:  Past Medical History:   Diagnosis Date    Gout      Past Surgical History:   Procedure Laterality Date    HERNIA REPAIR  May 2020    Umbilical    NO PAST SURGERIES      [place] RPR UMBILICAL HRNA 5 YRS/> REDUCIBLE N/A 05/19/2021    Procedure: UMBILICAL HERNIA REPAIR;  Surgeon: Pascual Croft MD;  Location: [facility];  Service: General     Family History   Problem Relation Age of Onset    Cancer Father      Social History     Socioeconomic History    Marital status: Single     Spouse name: Not on file    Number of children: Not on file    Years of education: Not on file    Highest education level: Not on file   Occupational History    Not on file   Tobacco Use    Smoking status: Never    Smokeless tobacco: Current     Types: Chew   Vaping Use    Vaping status: Never Used   Substance and Sexual Activity    Alcohol use: Yes     Alcohol/week: 3.0 standard drinks of alcohol     Types: 3 Cans of beer per week     Comment: social    Drug use: Never    Sexual activity: Yes     Partners: Female     Birth control/protection: Condom Male   Other Topics Concern    Not on file   Social History Narrative    Not on file     Social Determinants of Health     Financial Resource Strain: Not on file   Food Insecurity: Not on file   Transportation Needs: Not on file   Physical Activity: Not on file   Stress: Not on file   Social Connections: Not on file  "  Intimate Partner Violence: Not on file   Housing Stability: Not on file     Scheduled Meds:  Continuous Infusions:No current facility-administered medications for this visit.    PRN Meds:.  No Known Allergies    Physical Examination:    /78   Ht 5' 10\" (1.778 m)   Wt 97.1 kg (214 lb)   BMI 30.71 kg/m²     Gen: A&Ox3, NAD  Cardiac: regular rate  Chest: non labored breathing  Abdomen: Non-distended        Left Upper Extremity:  Almost complete reepithelialization with a little bit of dermis exposed at the very tip of the ring finger.  New nail is growing out about 50% of the nail bed has been recovered.  Decreased DIP range of motion  Able to make a loose fist  Decree sensation on the radial ulnar aspect of the fingertip distal to the eponychial fold, normal sensation proximally  Warm well-perfused finger    Studies:  No new imaging    Assessment and Plan:  1. Traumatic amputation of left ring finger            38 y.o. male presents in follow up for the above diagnosis.  Doing really well and his wound is almost completely closed and reepithelialized.  Continue with wound care as needed.  Continue to work on range of motion as tolerated.  No restrictions at this point.  I will see him in 1 month for repeat evaluation.  Will reassess his nail at that time.    he expressed understanding of the plan and agreed. We encouraged them to contact our office with any questions or concerns.       Gustabo Nguyen MD  Hand and Upper Extremity Surgery      *This note was dictated using Dragon voice recognition software. Please excuse any word substitutions or errors.*    "

## 2024-09-27 ENCOUNTER — OFFICE VISIT (OUTPATIENT)
Dept: OBGYN CLINIC | Facility: CLINIC | Age: 39
End: 2024-09-27
Payer: COMMERCIAL

## 2024-09-27 VITALS
WEIGHT: 214 LBS | SYSTOLIC BLOOD PRESSURE: 138 MMHG | HEIGHT: 70 IN | DIASTOLIC BLOOD PRESSURE: 80 MMHG | BODY MASS INDEX: 30.64 KG/M2

## 2024-09-27 DIAGNOSIS — S68.115A TRAUMATIC AMPUTATION OF LEFT RING FINGER: Primary | ICD-10-CM

## 2024-09-27 PROCEDURE — 99213 OFFICE O/P EST LOW 20 MIN: CPT | Performed by: ORTHOPAEDIC SURGERY

## 2024-09-27 NOTE — PROGRESS NOTES
HAND & UPPER EXTREMITY OFFICE VISIT   Referred By:  No referring provider defined for this encounter.      Chief Complaint:     Left ring finger pressure amputation    Previous History:   Injury on 7/5/2024.  Last seen on 8/23/2024 with near complete re-epithelialization and growth of nail.     Interval History:  He has returned to  work and all activities that he was previously doing. No further wound care. Has continues at home ROM exercises. Wears a protective sleeve. States his sensation has improved, the nail has completely grown and he has trimmed it once.     Past Medical History:  Past Medical History:   Diagnosis Date    Gout      Past Surgical History:   Procedure Laterality Date    HERNIA REPAIR  May 2020    Umbilical    NO PAST SURGERIES      [place] RPR UMBILICAL HRNA 5 YRS/> REDUCIBLE N/A 05/19/2021    Procedure: UMBILICAL HERNIA REPAIR;  Surgeon: Pascual Croft MD;  Location: [facility];  Service: General     Family History   Problem Relation Age of Onset    Cancer Father      Social History     Socioeconomic History    Marital status: Single     Spouse name: Not on file    Number of children: Not on file    Years of education: Not on file    Highest education level: Not on file   Occupational History    Not on file   Tobacco Use    Smoking status: Never    Smokeless tobacco: Current     Types: Chew   Vaping Use    Vaping status: Never Used   Substance and Sexual Activity    Alcohol use: Yes     Alcohol/week: 3.0 standard drinks of alcohol     Types: 3 Cans of beer per week     Comment: social    Drug use: Never    Sexual activity: Yes     Partners: Female     Birth control/protection: Condom Male   Other Topics Concern    Not on file   Social History Narrative    Not on file     Social Determinants of Health     Financial Resource Strain: Not on file   Food Insecurity: Not on file   Transportation Needs: Not on file   Physical Activity: Not on file   Stress: Not on file   Social Connections: Not on file  "  Intimate Partner Violence: Not on file   Housing Stability: Not on file     Scheduled Meds:  Continuous Infusions:No current facility-administered medications for this visit.    PRN Meds:.  No Known Allergies    Physical Examination:    /80   Ht 5' 10\" (1.778 m)   Wt 97.1 kg (214 lb)   BMI 30.71 kg/m²     Gen: A&Ox3, NAD    Left Upper Extremity:  Complete reepithelialization of the ring finger.    New nail is growing out about 100% of the nail bed has been covered.  Full DIP range of motion  Able to make a complete composite fist  Decreased sensation on the radial ulnar aspect of the fingertip distal to the nail at the distal extent of the digit. Normal sensation proximally  Warm well-perfused finger    Studies:  No new imaging    Assessment and Plan:  1. Traumatic amputation of left ring finger              38 y.o. male presents in follow up for the above diagnosis.  I am very pleased with his clinical appearance in the office today - complete re-epithelialization of the left ring finger. Continue to work on range of motion as tolerated.  No restrictions at this point.  I will see him PRN.    he expressed understanding of the plan and agreed. We encouraged them to contact our office with any questions or concerns.       Gustabo Nguyen MD  Hand and Upper Extremity Surgery      *This note was dictated using Dragon voice recognition software. Please excuse any word substitutions or errors.*    "

## (undated) DEVICE — CHLORAPREP HI-LITE 26ML ORANGE

## (undated) DEVICE — VIAL DECANTER

## (undated) DEVICE — PAD GROUNDING ADULT

## (undated) DEVICE — BETHLEHEM UNIVERSAL MINOR GEN: Brand: CARDINAL HEALTH

## (undated) DEVICE — DRAPE EQUIPMENT RF WAND

## (undated) DEVICE — SUT MONOCRYL 4-0 PS-2 18 IN Y496G

## (undated) DEVICE — NEEDLE 22 G X 1 1/2 SAFETY

## (undated) DEVICE — SUT VICRYL 2-0 REEL 54 IN J286G

## (undated) DEVICE — ADHESIVE SKIN HIGH VISCOSITY EXOFIN 1ML

## (undated) DEVICE — 3M™ TEGADERM™ TRANSPARENT FILM DRESSING FRAME STYLE, 1626W, 4 IN X 4-3/4 IN (10 CM X 12 CM), 50/CT 4CT/CASE: Brand: 3M™ TEGADERM™

## (undated) DEVICE — SUT VICRYL 3-0 SH 27 IN J416H

## (undated) DEVICE — INTENDED FOR TISSUE SEPARATION, AND OTHER PROCEDURES THAT REQUIRE A SHARP SURGICAL BLADE TO PUNCTURE OR CUT.: Brand: BARD-PARKER SAFETY BLADES SIZE 15, STERILE

## (undated) DEVICE — LIGHT HANDLE COVER SLEEVE DISP BLUE STELLAR

## (undated) DEVICE — HYDROPHILIC WOUND DRESSING WITH ZINC PLUS VITAMINS A AND B6.: Brand: DERMAGRAN®-B

## (undated) DEVICE — UNDYED BRAIDED (POLYGLACTIN 910), SYNTHETIC ABSORBABLE SUTURE: Brand: COATED VICRYL

## (undated) DEVICE — TOWEL SET X-RAY

## (undated) DEVICE — PENCIL ELECTROSURG E-Z CLEAN -0035H

## (undated) DEVICE — GLOVE SRG BIOGEL ECLIPSE 7